# Patient Record
Sex: FEMALE | Employment: UNEMPLOYED | URBAN - METROPOLITAN AREA
[De-identification: names, ages, dates, MRNs, and addresses within clinical notes are randomized per-mention and may not be internally consistent; named-entity substitution may affect disease eponyms.]

---

## 2017-05-04 ENCOUNTER — HOSPITAL ENCOUNTER (OUTPATIENT)
Dept: ULTRASOUND IMAGING | Age: 52
Discharge: HOME | End: 2017-05-04
Attending: FAMILY MEDICINE | Admitting: FAMILY MEDICINE

## 2017-05-04 DIAGNOSIS — Z30.431 IUD CHECK UP: ICD-10-CM

## 2019-07-22 ENCOUNTER — HOSPITAL ENCOUNTER (EMERGENCY)
Age: 54
Discharge: HOME | End: 2019-07-22

## 2019-07-22 ENCOUNTER — APPOINTMENT (OUTPATIENT)
Dept: GENERAL RADIOLOGY | Age: 54
End: 2019-07-22

## 2019-07-22 VITALS
WEIGHT: 160 LBS | TEMPERATURE: 99.1 F | DIASTOLIC BLOOD PRESSURE: 76 MMHG | SYSTOLIC BLOOD PRESSURE: 153 MMHG | HEART RATE: 76 BPM | BODY MASS INDEX: 32.32 KG/M2 | OXYGEN SATURATION: 99 % | RESPIRATION RATE: 16 BRPM

## 2019-07-22 DIAGNOSIS — S93.492A SPRAIN OF ANTERIOR TALOFIBULAR LIGAMENT OF LEFT ANKLE, INITIAL ENCOUNTER: Primary | ICD-10-CM

## 2019-07-22 DIAGNOSIS — M19.072 PRIMARY OSTEOARTHRITIS OF LEFT ANKLE: ICD-10-CM

## 2019-07-22 PROCEDURE — 6370000000 HC RX 637 (ALT 250 FOR IP): Performed by: PHYSICIAN ASSISTANT

## 2019-07-22 PROCEDURE — 73610 X-RAY EXAM OF ANKLE: CPT

## 2019-07-22 PROCEDURE — 99283 EMERGENCY DEPT VISIT LOW MDM: CPT

## 2019-07-22 RX ORDER — NAPROXEN 250 MG/1
500 TABLET ORAL ONCE
Status: COMPLETED | OUTPATIENT
Start: 2019-07-22 | End: 2019-07-22

## 2019-07-22 RX ORDER — NAPROXEN 500 MG/1
500 TABLET ORAL 2 TIMES DAILY PRN
Qty: 20 TABLET | Refills: 0 | Status: ON HOLD | OUTPATIENT
Start: 2019-07-22 | End: 2019-10-28 | Stop reason: HOSPADM

## 2019-07-22 RX ORDER — NAPROXEN 250 MG/1
500 TABLET ORAL 2 TIMES DAILY WITH MEALS
COMMUNITY
End: 2019-07-22

## 2019-07-22 RX ADMIN — NAPROXEN 500 MG: 250 TABLET ORAL at 15:46

## 2019-07-22 ASSESSMENT — ENCOUNTER SYMPTOMS
CHEST TIGHTNESS: 0
VOMITING: 0
COLOR CHANGE: 0
ABDOMINAL PAIN: 0
DIARRHEA: 0
NAUSEA: 0
SHORTNESS OF BREATH: 0

## 2019-07-22 ASSESSMENT — PAIN DESCRIPTION - PAIN TYPE: TYPE: ACUTE PAIN

## 2019-07-22 ASSESSMENT — PAIN SCALES - GENERAL
PAINLEVEL_OUTOF10: 8
PAINLEVEL_OUTOF10: 8

## 2019-07-22 ASSESSMENT — PAIN DESCRIPTION - ORIENTATION: ORIENTATION: LEFT

## 2019-07-22 ASSESSMENT — PAIN DESCRIPTION - LOCATION: LOCATION: ANKLE

## 2019-07-22 NOTE — ED PROVIDER NOTES
worsen    Misael Barrera, [address] [facility]  [place] [address]  [phone]    Schedule an appointment as soon as possible for a visit   If symptoms worsen      DISCHARGE MEDICATIONS:  New Prescriptions    NAPROXEN (NAPROSYN) 500 MG TABLET    Take 1 tablet by mouth 2 times daily as needed for Pain       DISCONTINUED MEDICATIONS:  Discontinued Medications    NAPROXEN (NAPROSYN) 250 MG TABLET    Take 500 mg by mouth 2 times daily (with meals)            (Please note the MDM and HPI sections of this note were completed with a voice recognition program.  Efforts weremade to edit the dictations but occasionally words are mis-transcribed.)    Electronically signed, Shayy Driver PA-C,          Srinivas Sumner PA-C  07/22/19 4747

## 2019-08-06 ENCOUNTER — OFFICE VISIT (OUTPATIENT)
Dept: ORTHOPEDIC SURGERY | Age: 54
End: 2019-08-06

## 2019-08-06 VITALS
HEIGHT: 59 IN | SYSTOLIC BLOOD PRESSURE: 125 MMHG | RESPIRATION RATE: 16 BRPM | BODY MASS INDEX: 32.25 KG/M2 | WEIGHT: 160 LBS | DIASTOLIC BLOOD PRESSURE: 78 MMHG | HEART RATE: 106 BPM

## 2019-08-06 DIAGNOSIS — M65.9 SYNOVITIS OF LEFT ANKLE: Primary | ICD-10-CM

## 2019-08-06 PROBLEM — M65.972 SYNOVITIS OF LEFT ANKLE: Status: ACTIVE | Noted: 2019-08-06

## 2019-08-06 PROCEDURE — 99203 OFFICE O/P NEW LOW 30 MIN: CPT | Performed by: ORTHOPAEDIC SURGERY

## 2019-08-06 RX ORDER — INDOMETHACIN 50 MG/1
50 CAPSULE ORAL 3 TIMES DAILY
Qty: 42 CAPSULE | Refills: 0 | Status: ON HOLD | OUTPATIENT
Start: 2019-08-06 | End: 2019-10-28 | Stop reason: HOSPADM

## 2019-08-06 RX ORDER — METHYLPREDNISOLONE 4 MG/1
TABLET ORAL
Qty: 1 KIT | Refills: 0 | Status: SHIPPED | OUTPATIENT
Start: 2019-08-06 | End: 2019-08-22 | Stop reason: ALTCHOICE

## 2019-08-22 ENCOUNTER — OFFICE VISIT (OUTPATIENT)
Dept: ORTHOPEDIC SURGERY | Age: 54
End: 2019-08-22

## 2019-08-22 VITALS
HEART RATE: 110 BPM | BODY MASS INDEX: 32.27 KG/M2 | DIASTOLIC BLOOD PRESSURE: 86 MMHG | SYSTOLIC BLOOD PRESSURE: 146 MMHG | HEIGHT: 59 IN | WEIGHT: 160.05 LBS | RESPIRATION RATE: 17 BRPM

## 2019-08-22 DIAGNOSIS — M65.9 SYNOVITIS OF LEFT ANKLE: Primary | ICD-10-CM

## 2019-08-22 PROCEDURE — 20605 DRAIN/INJ JOINT/BURSA W/O US: CPT | Performed by: ORTHOPAEDIC SURGERY

## 2019-08-22 PROCEDURE — 99214 OFFICE O/P EST MOD 30 MIN: CPT | Performed by: ORTHOPAEDIC SURGERY

## 2019-10-03 ENCOUNTER — OFFICE VISIT (OUTPATIENT)
Dept: ORTHOPEDIC SURGERY | Age: 54
End: 2019-10-03

## 2019-10-03 VITALS
HEIGHT: 59 IN | HEART RATE: 117 BPM | BODY MASS INDEX: 32.25 KG/M2 | SYSTOLIC BLOOD PRESSURE: 134 MMHG | DIASTOLIC BLOOD PRESSURE: 80 MMHG | WEIGHT: 160 LBS

## 2019-10-03 DIAGNOSIS — M65.9 SYNOVITIS OF LEFT ANKLE: Primary | ICD-10-CM

## 2019-10-03 PROCEDURE — 99214 OFFICE O/P EST MOD 30 MIN: CPT | Performed by: ORTHOPAEDIC SURGERY

## 2019-10-03 RX ORDER — MELOXICAM 7.5 MG/1
7.5 TABLET ORAL DAILY
Qty: 30 TABLET | Refills: 0 | Status: ON HOLD | OUTPATIENT
Start: 2019-10-03 | End: 2019-10-28 | Stop reason: HOSPADM

## 2019-10-14 ENCOUNTER — HOSPITAL ENCOUNTER (OUTPATIENT)
Dept: WOMENS IMAGING | Age: 54
Discharge: HOME | End: 2019-10-14
Payer: COMMERCIAL

## 2019-10-14 DIAGNOSIS — Z12.31 BREAST CANCER SCREENING BY MAMMOGRAM: ICD-10-CM

## 2019-10-14 PROCEDURE — 77067 SCR MAMMO BI INCL CAD: CPT

## 2019-10-18 ENCOUNTER — HOSPITAL ENCOUNTER (OUTPATIENT)
Dept: MRI IMAGING | Age: 54
Discharge: HOME | End: 2019-10-18

## 2019-10-18 DIAGNOSIS — M65.9 SYNOVITIS OF LEFT ANKLE: ICD-10-CM

## 2019-10-18 PROCEDURE — 73721 MRI JNT OF LWR EXTRE W/O DYE: CPT

## 2019-10-22 ENCOUNTER — HOSPITAL ENCOUNTER (INPATIENT)
Age: 54
LOS: 5 days | Discharge: HOME | DRG: 313 | End: 2019-10-28
Attending: EMERGENCY MEDICINE | Admitting: HOSPITALIST
Payer: MEDICAID

## 2019-10-22 DIAGNOSIS — L03.119 CELLULITIS AND ABSCESS OF LEG: Primary | ICD-10-CM

## 2019-10-22 DIAGNOSIS — M25.572 ACUTE LEFT ANKLE PAIN: ICD-10-CM

## 2019-10-22 DIAGNOSIS — L02.419 CELLULITIS AND ABSCESS OF LEG: Primary | ICD-10-CM

## 2019-10-22 PROCEDURE — 99284 EMERGENCY DEPT VISIT MOD MDM: CPT

## 2019-10-22 ASSESSMENT — PAIN DESCRIPTION - PAIN TYPE: TYPE: ACUTE PAIN

## 2019-10-22 ASSESSMENT — PAIN DESCRIPTION - LOCATION: LOCATION: FOOT

## 2019-10-22 ASSESSMENT — PAIN DESCRIPTION - ORIENTATION: ORIENTATION: LEFT

## 2019-10-22 ASSESSMENT — PAIN SCALES - GENERAL: PAINLEVEL_OUTOF10: 9

## 2019-10-23 PROBLEM — L03.116 CELLULITIS OF LEFT ANKLE: Status: ACTIVE | Noted: 2019-10-23

## 2019-10-23 LAB
A/G RATIO: 0.9 (ref 1.1–2.2)
ALBUMIN SERPL-MCNC: 3.9 G/DL (ref 3.4–5)
ALP BLD-CCNC: 87 U/L (ref 40–129)
ALT SERPL-CCNC: 11 U/L (ref 10–40)
ANION GAP SERPL CALCULATED.3IONS-SCNC: 9 MMOL/L (ref 3–16)
AST SERPL-CCNC: 12 U/L (ref 15–37)
BASOPHILS ABSOLUTE: 0 K/UL (ref 0–0.2)
BASOPHILS RELATIVE PERCENT: 0.6 %
BILIRUB SERPL-MCNC: <0.2 MG/DL (ref 0–1)
BUN BLDV-MCNC: 23 MG/DL (ref 7–20)
C-REACTIVE PROTEIN: 6 MG/L (ref 0–5.1)
CALCIUM SERPL-MCNC: 9.3 MG/DL (ref 8.3–10.6)
CHLORIDE BLD-SCNC: 105 MMOL/L (ref 99–110)
CO2: 24 MMOL/L (ref 21–32)
CREAT SERPL-MCNC: 0.9 MG/DL (ref 0.6–1.1)
EOSINOPHILS ABSOLUTE: 0.2 K/UL (ref 0–0.6)
EOSINOPHILS RELATIVE PERCENT: 2.5 %
GFR AFRICAN AMERICAN: >60
GFR NON-AFRICAN AMERICAN: >60
GLOBULIN: 4.5 G/DL
GLUCOSE BLD-MCNC: 117 MG/DL (ref 70–99)
HCT VFR BLD CALC: 36.8 % (ref 36–48)
HEMOGLOBIN: 12.3 G/DL (ref 12–16)
LACTIC ACID, SEPSIS: 0.8 MMOL/L (ref 0.4–1.9)
LYMPHOCYTES ABSOLUTE: 2.5 K/UL (ref 1–5.1)
LYMPHOCYTES RELATIVE PERCENT: 35.3 %
MCH RBC QN AUTO: 29 PG (ref 26–34)
MCHC RBC AUTO-ENTMCNC: 33.5 G/DL (ref 31–36)
MCV RBC AUTO: 86.5 FL (ref 80–100)
MONOCYTES ABSOLUTE: 0.6 K/UL (ref 0–1.3)
MONOCYTES RELATIVE PERCENT: 7.8 %
NEUTROPHILS ABSOLUTE: 3.8 K/UL (ref 1.7–7.7)
NEUTROPHILS RELATIVE PERCENT: 53.8 %
PDW BLD-RTO: 14.1 % (ref 12.4–15.4)
PLATELET # BLD: 316 K/UL (ref 135–450)
PMV BLD AUTO: 9.1 FL (ref 5–10.5)
POTASSIUM REFLEX MAGNESIUM: 3.6 MMOL/L (ref 3.5–5.1)
RBC # BLD: 4.26 M/UL (ref 4–5.2)
SEDIMENTATION RATE, ERYTHROCYTE: 87 MM/HR (ref 0–30)
SODIUM BLD-SCNC: 138 MMOL/L (ref 136–145)
TOTAL PROTEIN: 8.4 G/DL (ref 6.4–8.2)
WBC # BLD: 7.1 K/UL (ref 4–11)

## 2019-10-23 PROCEDURE — 87070 CULTURE OTHR SPECIMN AEROBIC: CPT

## 2019-10-23 PROCEDURE — 87077 CULTURE AEROBIC IDENTIFY: CPT

## 2019-10-23 PROCEDURE — 2580000003 HC RX 258: Performed by: EMERGENCY MEDICINE

## 2019-10-23 PROCEDURE — 87040 BLOOD CULTURE FOR BACTERIA: CPT

## 2019-10-23 PROCEDURE — 6370000000 HC RX 637 (ALT 250 FOR IP): Performed by: PHYSICIAN ASSISTANT

## 2019-10-23 PROCEDURE — 96365 THER/PROPH/DIAG IV INF INIT: CPT

## 2019-10-23 PROCEDURE — 85652 RBC SED RATE AUTOMATED: CPT

## 2019-10-23 PROCEDURE — 6360000002 HC RX W HCPCS: Performed by: HOSPITALIST

## 2019-10-23 PROCEDURE — 87186 SC STD MICRODIL/AGAR DIL: CPT

## 2019-10-23 PROCEDURE — 2580000003 HC RX 258: Performed by: HOSPITALIST

## 2019-10-23 PROCEDURE — 80053 COMPREHEN METABOLIC PANEL: CPT

## 2019-10-23 PROCEDURE — 87205 SMEAR GRAM STAIN: CPT

## 2019-10-23 PROCEDURE — 86140 C-REACTIVE PROTEIN: CPT

## 2019-10-23 PROCEDURE — 6360000002 HC RX W HCPCS: Performed by: INTERNAL MEDICINE

## 2019-10-23 PROCEDURE — 1200000000 HC SEMI PRIVATE

## 2019-10-23 PROCEDURE — 99255 IP/OBS CONSLTJ NEW/EST HI 80: CPT | Performed by: INTERNAL MEDICINE

## 2019-10-23 PROCEDURE — 6360000002 HC RX W HCPCS: Performed by: EMERGENCY MEDICINE

## 2019-10-23 PROCEDURE — 85025 COMPLETE CBC W/AUTO DIFF WBC: CPT

## 2019-10-23 PROCEDURE — 83605 ASSAY OF LACTIC ACID: CPT

## 2019-10-23 PROCEDURE — 36415 COLL VENOUS BLD VENIPUNCTURE: CPT

## 2019-10-23 PROCEDURE — 96367 TX/PROPH/DG ADDL SEQ IV INF: CPT

## 2019-10-23 PROCEDURE — 94760 N-INVAS EAR/PLS OXIMETRY 1: CPT

## 2019-10-23 PROCEDURE — 6370000000 HC RX 637 (ALT 250 FOR IP): Performed by: HOSPITALIST

## 2019-10-23 RX ORDER — VANCOMYCIN HYDROCHLORIDE 1 G/200ML
1000 INJECTION, SOLUTION INTRAVENOUS ONCE
Status: COMPLETED | OUTPATIENT
Start: 2019-10-23 | End: 2019-10-23

## 2019-10-23 RX ORDER — SODIUM CHLORIDE 0.9 % (FLUSH) 0.9 %
10 SYRINGE (ML) INJECTION EVERY 12 HOURS SCHEDULED
Status: DISCONTINUED | OUTPATIENT
Start: 2019-10-23 | End: 2019-10-24 | Stop reason: SDUPTHER

## 2019-10-23 RX ORDER — VANCOMYCIN HYDROCHLORIDE 1 G/200ML
1000 INJECTION, SOLUTION INTRAVENOUS EVERY 12 HOURS
Status: DISCONTINUED | OUTPATIENT
Start: 2019-10-23 | End: 2019-10-24

## 2019-10-23 RX ORDER — POTASSIUM CHLORIDE 7.45 MG/ML
10 INJECTION INTRAVENOUS PRN
Status: DISCONTINUED | OUTPATIENT
Start: 2019-10-23 | End: 2019-10-28 | Stop reason: HOSPADM

## 2019-10-23 RX ORDER — SODIUM CHLORIDE 9 MG/ML
INJECTION, SOLUTION INTRAVENOUS CONTINUOUS
Status: DISCONTINUED | OUTPATIENT
Start: 2019-10-23 | End: 2019-10-26

## 2019-10-23 RX ORDER — ONDANSETRON 2 MG/ML
4 INJECTION INTRAMUSCULAR; INTRAVENOUS EVERY 6 HOURS PRN
Status: DISCONTINUED | OUTPATIENT
Start: 2019-10-23 | End: 2019-10-24 | Stop reason: SDUPTHER

## 2019-10-23 RX ORDER — MAGNESIUM SULFATE 1 G/100ML
1 INJECTION INTRAVENOUS PRN
Status: DISCONTINUED | OUTPATIENT
Start: 2019-10-23 | End: 2019-10-28 | Stop reason: HOSPADM

## 2019-10-23 RX ORDER — POTASSIUM CHLORIDE 20 MEQ/1
40 TABLET, EXTENDED RELEASE ORAL PRN
Status: DISCONTINUED | OUTPATIENT
Start: 2019-10-23 | End: 2019-10-28 | Stop reason: HOSPADM

## 2019-10-23 RX ORDER — ACETAMINOPHEN 325 MG/1
650 TABLET ORAL EVERY 4 HOURS PRN
Status: DISCONTINUED | OUTPATIENT
Start: 2019-10-23 | End: 2019-10-28 | Stop reason: HOSPADM

## 2019-10-23 RX ORDER — VANCOMYCIN HYDROCHLORIDE 1 G/200ML
1000 INJECTION, SOLUTION INTRAVENOUS
Status: DISCONTINUED | OUTPATIENT
Start: 2019-10-23 | End: 2019-10-23

## 2019-10-23 RX ORDER — SODIUM CHLORIDE 0.9 % (FLUSH) 0.9 %
10 SYRINGE (ML) INJECTION PRN
Status: DISCONTINUED | OUTPATIENT
Start: 2019-10-23 | End: 2019-10-24 | Stop reason: SDUPTHER

## 2019-10-23 RX ORDER — FAMOTIDINE 20 MG/1
20 TABLET, FILM COATED ORAL 2 TIMES DAILY
Status: DISCONTINUED | OUTPATIENT
Start: 2019-10-23 | End: 2019-10-28 | Stop reason: HOSPADM

## 2019-10-23 RX ORDER — CLINDAMYCIN PHOSPHATE 600 MG/50ML
600 INJECTION INTRAVENOUS ONCE
Status: DISCONTINUED | OUTPATIENT
Start: 2019-10-23 | End: 2019-10-23

## 2019-10-23 RX ORDER — VANCOMYCIN HYDROCHLORIDE 1 G/200ML
1000 INJECTION, SOLUTION INTRAVENOUS EVERY 12 HOURS
Status: DISCONTINUED | OUTPATIENT
Start: 2019-10-23 | End: 2019-10-23 | Stop reason: DRUGHIGH

## 2019-10-23 RX ORDER — OXYCODONE HYDROCHLORIDE AND ACETAMINOPHEN 5; 325 MG/1; MG/1
1 TABLET ORAL ONCE
Status: COMPLETED | OUTPATIENT
Start: 2019-10-23 | End: 2019-10-23

## 2019-10-23 RX ADMIN — Medication 10 ML: at 21:50

## 2019-10-23 RX ADMIN — OXYCODONE HYDROCHLORIDE AND ACETAMINOPHEN 1 TABLET: 5; 325 TABLET ORAL at 01:58

## 2019-10-23 RX ADMIN — CEFEPIME HYDROCHLORIDE 2 G: 2 INJECTION, POWDER, FOR SOLUTION INTRAVENOUS at 13:05

## 2019-10-23 RX ADMIN — VANCOMYCIN HYDROCHLORIDE 1000 MG: 1 INJECTION, SOLUTION INTRAVENOUS at 15:14

## 2019-10-23 RX ADMIN — SODIUM CHLORIDE: 9 INJECTION, SOLUTION INTRAVENOUS at 22:06

## 2019-10-23 RX ADMIN — VANCOMYCIN HYDROCHLORIDE 1000 MG: 1 INJECTION, SOLUTION INTRAVENOUS at 01:38

## 2019-10-23 RX ADMIN — ACETAMINOPHEN 650 MG: 325 TABLET, FILM COATED ORAL at 19:36

## 2019-10-23 RX ADMIN — FAMOTIDINE 20 MG: 20 TABLET ORAL at 21:49

## 2019-10-23 RX ADMIN — SODIUM CHLORIDE: 9 INJECTION, SOLUTION INTRAVENOUS at 04:33

## 2019-10-23 RX ADMIN — ACETAMINOPHEN 650 MG: 325 TABLET, FILM COATED ORAL at 10:00

## 2019-10-23 RX ADMIN — CEFTRIAXONE 2 G: 2 INJECTION, POWDER, FOR SOLUTION INTRAMUSCULAR; INTRAVENOUS at 01:07

## 2019-10-23 ASSESSMENT — PAIN DESCRIPTION - LOCATION
LOCATION: ANKLE

## 2019-10-23 ASSESSMENT — PAIN SCALES - GENERAL
PAINLEVEL_OUTOF10: 9
PAINLEVEL_OUTOF10: 7
PAINLEVEL_OUTOF10: 7
PAINLEVEL_OUTOF10: 3
PAINLEVEL_OUTOF10: 8
PAINLEVEL_OUTOF10: 5

## 2019-10-23 ASSESSMENT — ENCOUNTER SYMPTOMS
RHINORRHEA: 0
NAUSEA: 0
APNEA: 0
PHOTOPHOBIA: 0
EYE REDNESS: 0
EYE DISCHARGE: 0
DIARRHEA: 0
BLOOD IN STOOL: 0
COUGH: 0
SHORTNESS OF BREATH: 0
STRIDOR: 0
VOMITING: 0
CHEST TIGHTNESS: 0
FACIAL SWELLING: 0
TROUBLE SWALLOWING: 0
COLOR CHANGE: 0
CHOKING: 0
WHEEZING: 0
ABDOMINAL PAIN: 0

## 2019-10-23 ASSESSMENT — PAIN DESCRIPTION - ORIENTATION
ORIENTATION: LEFT

## 2019-10-23 ASSESSMENT — PAIN DESCRIPTION - PAIN TYPE
TYPE: ACUTE PAIN

## 2019-10-24 ENCOUNTER — ANESTHESIA EVENT (OUTPATIENT)
Dept: OPERATING ROOM | Age: 54
DRG: 313 | End: 2019-10-24
Payer: MEDICAID

## 2019-10-24 ENCOUNTER — ANESTHESIA (OUTPATIENT)
Dept: OPERATING ROOM | Age: 54
DRG: 313 | End: 2019-10-24
Payer: MEDICAID

## 2019-10-24 VITALS
DIASTOLIC BLOOD PRESSURE: 66 MMHG | SYSTOLIC BLOOD PRESSURE: 126 MMHG | OXYGEN SATURATION: 100 % | RESPIRATION RATE: 2 BRPM

## 2019-10-24 LAB
A/G RATIO: 0.9 (ref 1.1–2.2)
ALBUMIN SERPL-MCNC: 3.6 G/DL (ref 3.4–5)
ALP BLD-CCNC: 76 U/L (ref 40–129)
ALT SERPL-CCNC: 9 U/L (ref 10–40)
ANION GAP SERPL CALCULATED.3IONS-SCNC: 10 MMOL/L (ref 3–16)
AST SERPL-CCNC: 14 U/L (ref 15–37)
BASOPHILS ABSOLUTE: 0 K/UL (ref 0–0.2)
BASOPHILS RELATIVE PERCENT: 0.4 %
BILIRUB SERPL-MCNC: 0.5 MG/DL (ref 0–1)
BUN BLDV-MCNC: 11 MG/DL (ref 7–20)
CALCIUM SERPL-MCNC: 9.5 MG/DL (ref 8.3–10.6)
CHLORIDE BLD-SCNC: 108 MMOL/L (ref 99–110)
CO2: 26 MMOL/L (ref 21–32)
CREAT SERPL-MCNC: <0.5 MG/DL (ref 0.6–1.1)
EOSINOPHILS ABSOLUTE: 0.1 K/UL (ref 0–0.6)
EOSINOPHILS RELATIVE PERCENT: 2.9 %
ESTIMATED AVERAGE GLUCOSE: 108.3 MG/DL
GFR AFRICAN AMERICAN: >60
GFR NON-AFRICAN AMERICAN: >60
GLOBULIN: 4 G/DL
GLUCOSE BLD-MCNC: 118 MG/DL (ref 70–99)
HBA1C MFR BLD: 5.4 %
HCT VFR BLD CALC: 35.9 % (ref 36–48)
HEMOGLOBIN: 11.9 G/DL (ref 12–16)
LYMPHOCYTES ABSOLUTE: 1.3 K/UL (ref 1–5.1)
LYMPHOCYTES RELATIVE PERCENT: 27.7 %
MAGNESIUM: 2.2 MG/DL (ref 1.8–2.4)
MCH RBC QN AUTO: 28.7 PG (ref 26–34)
MCHC RBC AUTO-ENTMCNC: 33.2 G/DL (ref 31–36)
MCV RBC AUTO: 86.5 FL (ref 80–100)
MONOCYTES ABSOLUTE: 0.4 K/UL (ref 0–1.3)
MONOCYTES RELATIVE PERCENT: 7.6 %
NEUTROPHILS ABSOLUTE: 2.9 K/UL (ref 1.7–7.7)
NEUTROPHILS RELATIVE PERCENT: 61.4 %
PDW BLD-RTO: 14.6 % (ref 12.4–15.4)
PLATELET # BLD: 262 K/UL (ref 135–450)
PMV BLD AUTO: 8.8 FL (ref 5–10.5)
POTASSIUM SERPL-SCNC: 4.6 MMOL/L (ref 3.5–5.1)
RBC # BLD: 4.15 M/UL (ref 4–5.2)
SODIUM BLD-SCNC: 144 MMOL/L (ref 136–145)
TOTAL PROTEIN: 7.6 G/DL (ref 6.4–8.2)
VANCOMYCIN TROUGH: 11.3 UG/ML (ref 10–20)
WBC # BLD: 4.8 K/UL (ref 4–11)

## 2019-10-24 PROCEDURE — 3700000000 HC ANESTHESIA ATTENDED CARE: Performed by: ORTHOPAEDIC SURGERY

## 2019-10-24 PROCEDURE — 7100000001 HC PACU RECOVERY - ADDTL 15 MIN: Performed by: ORTHOPAEDIC SURGERY

## 2019-10-24 PROCEDURE — 2580000003 HC RX 258: Performed by: ORTHOPAEDIC SURGERY

## 2019-10-24 PROCEDURE — 6360000002 HC RX W HCPCS: Performed by: INTERNAL MEDICINE

## 2019-10-24 PROCEDURE — 6360000002 HC RX W HCPCS: Performed by: NURSE ANESTHETIST, CERTIFIED REGISTERED

## 2019-10-24 PROCEDURE — 83036 HEMOGLOBIN GLYCOSYLATED A1C: CPT

## 2019-10-24 PROCEDURE — 6360000002 HC RX W HCPCS: Performed by: ANESTHESIOLOGY

## 2019-10-24 PROCEDURE — 27610 EXPLORE/TREAT ANKLE JOINT: CPT | Performed by: ORTHOPAEDIC SURGERY

## 2019-10-24 PROCEDURE — 3700000001 HC ADD 15 MINUTES (ANESTHESIA): Performed by: ORTHOPAEDIC SURGERY

## 2019-10-24 PROCEDURE — 2500000003 HC RX 250 WO HCPCS: Performed by: NURSE ANESTHETIST, CERTIFIED REGISTERED

## 2019-10-24 PROCEDURE — 2580000003 HC RX 258: Performed by: HOSPITALIST

## 2019-10-24 PROCEDURE — 6370000000 HC RX 637 (ALT 250 FOR IP): Performed by: ORTHOPAEDIC SURGERY

## 2019-10-24 PROCEDURE — 80202 ASSAY OF VANCOMYCIN: CPT

## 2019-10-24 PROCEDURE — 6360000002 HC RX W HCPCS: Performed by: HOSPITALIST

## 2019-10-24 PROCEDURE — 99233 SBSQ HOSP IP/OBS HIGH 50: CPT | Performed by: INTERNAL MEDICINE

## 2019-10-24 PROCEDURE — 83735 ASSAY OF MAGNESIUM: CPT

## 2019-10-24 PROCEDURE — 6360000002 HC RX W HCPCS: Performed by: ORTHOPAEDIC SURGERY

## 2019-10-24 PROCEDURE — 3600000002 HC SURGERY LEVEL 2 BASE: Performed by: ORTHOPAEDIC SURGERY

## 2019-10-24 PROCEDURE — 2709999900 HC NON-CHARGEABLE SUPPLY: Performed by: ORTHOPAEDIC SURGERY

## 2019-10-24 PROCEDURE — 36415 COLL VENOUS BLD VENIPUNCTURE: CPT

## 2019-10-24 PROCEDURE — 85025 COMPLETE CBC W/AUTO DIFF WBC: CPT

## 2019-10-24 PROCEDURE — 1200000000 HC SEMI PRIVATE

## 2019-10-24 PROCEDURE — 88305 TISSUE EXAM BY PATHOLOGIST: CPT

## 2019-10-24 PROCEDURE — 87070 CULTURE OTHR SPECIMN AEROBIC: CPT

## 2019-10-24 PROCEDURE — 6360000002 HC RX W HCPCS: Performed by: PHYSICIAN ASSISTANT

## 2019-10-24 PROCEDURE — 0S9G00Z DRAINAGE OF LEFT ANKLE JOINT WITH DRAINAGE DEVICE, OPEN APPROACH: ICD-10-PCS | Performed by: ORTHOPAEDIC SURGERY

## 2019-10-24 PROCEDURE — 87205 SMEAR GRAM STAIN: CPT

## 2019-10-24 PROCEDURE — 3600000012 HC SURGERY LEVEL 2 ADDTL 15MIN: Performed by: ORTHOPAEDIC SURGERY

## 2019-10-24 PROCEDURE — 99024 POSTOP FOLLOW-UP VISIT: CPT | Performed by: NURSE PRACTITIONER

## 2019-10-24 PROCEDURE — 87075 CULTR BACTERIA EXCEPT BLOOD: CPT

## 2019-10-24 PROCEDURE — APPNB30 APP NON BILLABLE TIME 0-30 MINS: Performed by: NURSE PRACTITIONER

## 2019-10-24 PROCEDURE — 7100000000 HC PACU RECOVERY - FIRST 15 MIN: Performed by: ORTHOPAEDIC SURGERY

## 2019-10-24 PROCEDURE — 6370000000 HC RX 637 (ALT 250 FOR IP): Performed by: PHYSICIAN ASSISTANT

## 2019-10-24 PROCEDURE — 80053 COMPREHEN METABOLIC PANEL: CPT

## 2019-10-24 PROCEDURE — 0QBM0ZX EXCISION OF LEFT TARSAL, OPEN APPROACH, DIAGNOSTIC: ICD-10-PCS | Performed by: ORTHOPAEDIC SURGERY

## 2019-10-24 RX ORDER — ONDANSETRON 2 MG/ML
4 INJECTION INTRAMUSCULAR; INTRAVENOUS
Status: DISCONTINUED | OUTPATIENT
Start: 2019-10-24 | End: 2019-10-24

## 2019-10-24 RX ORDER — OXYCODONE HYDROCHLORIDE AND ACETAMINOPHEN 5; 325 MG/1; MG/1
1 TABLET ORAL
Status: DISCONTINUED | OUTPATIENT
Start: 2019-10-24 | End: 2019-10-24

## 2019-10-24 RX ORDER — HYDROMORPHONE HCL 110MG/55ML
0.5 PATIENT CONTROLLED ANALGESIA SYRINGE INTRAVENOUS EVERY 5 MIN PRN
Status: COMPLETED | OUTPATIENT
Start: 2019-10-24 | End: 2019-10-24

## 2019-10-24 RX ORDER — SENNA AND DOCUSATE SODIUM 50; 8.6 MG/1; MG/1
1 TABLET, FILM COATED ORAL 2 TIMES DAILY
Status: DISCONTINUED | OUTPATIENT
Start: 2019-10-24 | End: 2019-10-28 | Stop reason: HOSPADM

## 2019-10-24 RX ORDER — MEPERIDINE HYDROCHLORIDE 25 MG/ML
12.5 INJECTION INTRAMUSCULAR; INTRAVENOUS; SUBCUTANEOUS EVERY 5 MIN PRN
Status: DISCONTINUED | OUTPATIENT
Start: 2019-10-24 | End: 2019-10-24

## 2019-10-24 RX ORDER — FENTANYL CITRATE 50 UG/ML
INJECTION, SOLUTION INTRAMUSCULAR; INTRAVENOUS PRN
Status: DISCONTINUED | OUTPATIENT
Start: 2019-10-24 | End: 2019-10-24 | Stop reason: SDUPTHER

## 2019-10-24 RX ORDER — DOCUSATE SODIUM 100 MG/1
100 CAPSULE, LIQUID FILLED ORAL 2 TIMES DAILY
Status: DISCONTINUED | OUTPATIENT
Start: 2019-10-24 | End: 2019-10-28 | Stop reason: HOSPADM

## 2019-10-24 RX ORDER — ONDANSETRON 2 MG/ML
4 INJECTION INTRAMUSCULAR; INTRAVENOUS EVERY 6 HOURS PRN
Status: DISCONTINUED | OUTPATIENT
Start: 2019-10-24 | End: 2019-10-28 | Stop reason: HOSPADM

## 2019-10-24 RX ORDER — MORPHINE SULFATE 2 MG/ML
2 INJECTION, SOLUTION INTRAMUSCULAR; INTRAVENOUS
Status: DISCONTINUED | OUTPATIENT
Start: 2019-10-24 | End: 2019-10-28 | Stop reason: HOSPADM

## 2019-10-24 RX ORDER — SODIUM CHLORIDE 450 MG/100ML
INJECTION, SOLUTION INTRAVENOUS CONTINUOUS
Status: DISCONTINUED | OUTPATIENT
Start: 2019-10-24 | End: 2019-10-26

## 2019-10-24 RX ORDER — DEXAMETHASONE SODIUM PHOSPHATE 4 MG/ML
INJECTION, SOLUTION INTRA-ARTICULAR; INTRALESIONAL; INTRAMUSCULAR; INTRAVENOUS; SOFT TISSUE PRN
Status: DISCONTINUED | OUTPATIENT
Start: 2019-10-24 | End: 2019-10-24 | Stop reason: SDUPTHER

## 2019-10-24 RX ORDER — LIDOCAINE HYDROCHLORIDE 20 MG/ML
INJECTION, SOLUTION EPIDURAL; INFILTRATION; INTRACAUDAL; PERINEURAL PRN
Status: DISCONTINUED | OUTPATIENT
Start: 2019-10-24 | End: 2019-10-24 | Stop reason: SDUPTHER

## 2019-10-24 RX ORDER — HYDROCODONE BITARTRATE AND ACETAMINOPHEN 5; 325 MG/1; MG/1
1 TABLET ORAL EVERY 4 HOURS PRN
Status: DISCONTINUED | OUTPATIENT
Start: 2019-10-24 | End: 2019-10-28 | Stop reason: HOSPADM

## 2019-10-24 RX ORDER — PROPOFOL 10 MG/ML
INJECTION, EMULSION INTRAVENOUS PRN
Status: DISCONTINUED | OUTPATIENT
Start: 2019-10-24 | End: 2019-10-24 | Stop reason: SDUPTHER

## 2019-10-24 RX ORDER — MORPHINE SULFATE 4 MG/ML
4 INJECTION, SOLUTION INTRAMUSCULAR; INTRAVENOUS EVERY 4 HOURS PRN
Status: DISCONTINUED | OUTPATIENT
Start: 2019-10-24 | End: 2019-10-28 | Stop reason: HOSPADM

## 2019-10-24 RX ORDER — SODIUM CHLORIDE 0.9 % (FLUSH) 0.9 %
10 SYRINGE (ML) INJECTION EVERY 12 HOURS SCHEDULED
Status: DISCONTINUED | OUTPATIENT
Start: 2019-10-24 | End: 2019-10-28

## 2019-10-24 RX ORDER — SODIUM CHLORIDE 0.9 % (FLUSH) 0.9 %
10 SYRINGE (ML) INJECTION PRN
Status: DISCONTINUED | OUTPATIENT
Start: 2019-10-24 | End: 2019-10-28

## 2019-10-24 RX ORDER — HYDRALAZINE HYDROCHLORIDE 20 MG/ML
5 INJECTION INTRAMUSCULAR; INTRAVENOUS EVERY 10 MIN PRN
Status: DISCONTINUED | OUTPATIENT
Start: 2019-10-24 | End: 2019-10-24

## 2019-10-24 RX ORDER — LABETALOL HYDROCHLORIDE 5 MG/ML
5 INJECTION, SOLUTION INTRAVENOUS EVERY 10 MIN PRN
Status: DISCONTINUED | OUTPATIENT
Start: 2019-10-24 | End: 2019-10-24

## 2019-10-24 RX ORDER — ONDANSETRON 2 MG/ML
INJECTION INTRAMUSCULAR; INTRAVENOUS PRN
Status: DISCONTINUED | OUTPATIENT
Start: 2019-10-24 | End: 2019-10-24 | Stop reason: SDUPTHER

## 2019-10-24 RX ADMIN — FENTANYL CITRATE 25 MCG: 50 INJECTION, SOLUTION INTRAMUSCULAR; INTRAVENOUS at 10:32

## 2019-10-24 RX ADMIN — DOCUSATE SODIUM 100 MG: 100 CAPSULE, LIQUID FILLED ORAL at 22:12

## 2019-10-24 RX ADMIN — HYDROMORPHONE HYDROCHLORIDE 0.5 MG: 2 INJECTION INTRAMUSCULAR; INTRAVENOUS; SUBCUTANEOUS at 11:09

## 2019-10-24 RX ADMIN — HYDROMORPHONE HYDROCHLORIDE 0.5 MG: 2 INJECTION INTRAMUSCULAR; INTRAVENOUS; SUBCUTANEOUS at 10:53

## 2019-10-24 RX ADMIN — Medication 10 ML: at 22:13

## 2019-10-24 RX ADMIN — FENTANYL CITRATE 50 MCG: 50 INJECTION, SOLUTION INTRAMUSCULAR; INTRAVENOUS at 10:04

## 2019-10-24 RX ADMIN — VANCOMYCIN HYDROCHLORIDE 1000 MG: 1 INJECTION, SOLUTION INTRAVENOUS at 01:58

## 2019-10-24 RX ADMIN — MORPHINE SULFATE 2 MG: 2 INJECTION, SOLUTION INTRAMUSCULAR; INTRAVENOUS at 13:24

## 2019-10-24 RX ADMIN — HYDROCODONE BITARTRATE AND ACETAMINOPHEN 1 TABLET: 5; 325 TABLET ORAL at 22:12

## 2019-10-24 RX ADMIN — CEFEPIME HYDROCHLORIDE 2 G: 2 INJECTION, POWDER, FOR SOLUTION INTRAVENOUS at 01:22

## 2019-10-24 RX ADMIN — HYDROMORPHONE HYDROCHLORIDE 0.5 MG: 2 INJECTION INTRAMUSCULAR; INTRAVENOUS; SUBCUTANEOUS at 11:30

## 2019-10-24 RX ADMIN — ONDANSETRON 4 MG: 2 INJECTION INTRAMUSCULAR; INTRAVENOUS at 10:10

## 2019-10-24 RX ADMIN — PROPOFOL 160 MG: 10 INJECTION, EMULSION INTRAVENOUS at 10:06

## 2019-10-24 RX ADMIN — DEXAMETHASONE SODIUM PHOSPHATE 8 MG: 4 INJECTION, SOLUTION INTRAMUSCULAR; INTRAVENOUS at 10:10

## 2019-10-24 RX ADMIN — VANCOMYCIN HYDROCHLORIDE 1000 MG: 1 INJECTION, SOLUTION INTRAVENOUS at 14:04

## 2019-10-24 RX ADMIN — SODIUM CHLORIDE: 9 INJECTION, SOLUTION INTRAVENOUS at 09:19

## 2019-10-24 RX ADMIN — SENNOSIDES AND DOCUSATE SODIUM 1 TABLET: 8.6; 5 TABLET ORAL at 22:12

## 2019-10-24 RX ADMIN — LIDOCAINE HYDROCHLORIDE 80 MG: 20 INJECTION, SOLUTION EPIDURAL; INFILTRATION; INTRACAUDAL; PERINEURAL at 10:06

## 2019-10-24 RX ADMIN — SODIUM CHLORIDE: 9 INJECTION, SOLUTION INTRAVENOUS at 13:30

## 2019-10-24 RX ADMIN — SODIUM CHLORIDE: 4.5 INJECTION, SOLUTION INTRAVENOUS at 13:33

## 2019-10-24 RX ADMIN — CEFEPIME HYDROCHLORIDE 2 G: 2 INJECTION, POWDER, FOR SOLUTION INTRAVENOUS at 13:26

## 2019-10-24 RX ADMIN — FAMOTIDINE 20 MG: 20 TABLET ORAL at 22:12

## 2019-10-24 RX ADMIN — FENTANYL CITRATE 25 MCG: 50 INJECTION, SOLUTION INTRAMUSCULAR; INTRAVENOUS at 10:31

## 2019-10-24 RX ADMIN — HYDROMORPHONE HYDROCHLORIDE 0.5 MG: 2 INJECTION INTRAMUSCULAR; INTRAVENOUS; SUBCUTANEOUS at 10:59

## 2019-10-24 ASSESSMENT — ENCOUNTER SYMPTOMS
ABDOMINAL PAIN: 0
EYE REDNESS: 0
FACIAL SWELLING: 0
EYE DISCHARGE: 0
COLOR CHANGE: 0
CHOKING: 0
TROUBLE SWALLOWING: 0
APNEA: 0
STRIDOR: 0
NAUSEA: 0
DIARRHEA: 0
CHEST TIGHTNESS: 0
COUGH: 0
SHORTNESS OF BREATH: 0
PHOTOPHOBIA: 0
BLOOD IN STOOL: 0
RHINORRHEA: 0

## 2019-10-24 ASSESSMENT — PAIN SCALES - GENERAL
PAINLEVEL_OUTOF10: 5
PAINLEVEL_OUTOF10: 9
PAINLEVEL_OUTOF10: 7
PAINLEVEL_OUTOF10: 10
PAINLEVEL_OUTOF10: 6
PAINLEVEL_OUTOF10: 7
PAINLEVEL_OUTOF10: 6
PAINLEVEL_OUTOF10: 9

## 2019-10-24 ASSESSMENT — PULMONARY FUNCTION TESTS
PIF_VALUE: 1
PIF_VALUE: 17
PIF_VALUE: 0
PIF_VALUE: 16
PIF_VALUE: 17
PIF_VALUE: 19
PIF_VALUE: 17
PIF_VALUE: 17
PIF_VALUE: 4
PIF_VALUE: 17
PIF_VALUE: 17
PIF_VALUE: 2
PIF_VALUE: 17
PIF_VALUE: 1
PIF_VALUE: 17
PIF_VALUE: 16
PIF_VALUE: 1
PIF_VALUE: 17
PIF_VALUE: 2
PIF_VALUE: 16
PIF_VALUE: 3
PIF_VALUE: 17
PIF_VALUE: 16
PIF_VALUE: 5
PIF_VALUE: 17
PIF_VALUE: 17
PIF_VALUE: 19
PIF_VALUE: 18
PIF_VALUE: 18
PIF_VALUE: 3
PIF_VALUE: 17
PIF_VALUE: 18
PIF_VALUE: 17
PIF_VALUE: 17
PIF_VALUE: 0
PIF_VALUE: 17
PIF_VALUE: 16
PIF_VALUE: 17
PIF_VALUE: 16
PIF_VALUE: 17
PIF_VALUE: 16
PIF_VALUE: 1
PIF_VALUE: 2

## 2019-10-24 ASSESSMENT — PAIN DESCRIPTION - PAIN TYPE
TYPE: SURGICAL PAIN

## 2019-10-24 ASSESSMENT — LIFESTYLE VARIABLES: SMOKING_STATUS: 0

## 2019-10-25 LAB
C-REACTIVE PROTEIN: 4 MG/L (ref 0–5.1)
GRAM STAIN RESULT: ABNORMAL
HCT VFR BLD CALC: 34.2 % (ref 36–48)
HEMOGLOBIN: 11.4 G/DL (ref 12–16)
ORGANISM: ABNORMAL
SEDIMENTATION RATE, ERYTHROCYTE: 52 MM/HR (ref 0–30)
WOUND/ABSCESS: ABNORMAL

## 2019-10-25 PROCEDURE — 6370000000 HC RX 637 (ALT 250 FOR IP): Performed by: ORTHOPAEDIC SURGERY

## 2019-10-25 PROCEDURE — 97165 OT EVAL LOW COMPLEX 30 MIN: CPT

## 2019-10-25 PROCEDURE — 2580000003 HC RX 258: Performed by: ORTHOPAEDIC SURGERY

## 2019-10-25 PROCEDURE — 6360000002 HC RX W HCPCS: Performed by: ORTHOPAEDIC SURGERY

## 2019-10-25 PROCEDURE — 99024 POSTOP FOLLOW-UP VISIT: CPT | Performed by: NURSE PRACTITIONER

## 2019-10-25 PROCEDURE — 97161 PT EVAL LOW COMPLEX 20 MIN: CPT

## 2019-10-25 PROCEDURE — APPNB60 APP NON BILLABLE TIME 46-60 MINS: Performed by: NURSE PRACTITIONER

## 2019-10-25 PROCEDURE — 6370000000 HC RX 637 (ALT 250 FOR IP): Performed by: PHYSICIAN ASSISTANT

## 2019-10-25 PROCEDURE — 86140 C-REACTIVE PROTEIN: CPT

## 2019-10-25 PROCEDURE — 1200000000 HC SEMI PRIVATE

## 2019-10-25 PROCEDURE — 36415 COLL VENOUS BLD VENIPUNCTURE: CPT

## 2019-10-25 PROCEDURE — 97530 THERAPEUTIC ACTIVITIES: CPT

## 2019-10-25 PROCEDURE — 85652 RBC SED RATE AUTOMATED: CPT

## 2019-10-25 PROCEDURE — 85018 HEMOGLOBIN: CPT

## 2019-10-25 PROCEDURE — 2580000003 HC RX 258: Performed by: INTERNAL MEDICINE

## 2019-10-25 PROCEDURE — 99233 SBSQ HOSP IP/OBS HIGH 50: CPT | Performed by: INTERNAL MEDICINE

## 2019-10-25 PROCEDURE — 94760 N-INVAS EAR/PLS OXIMETRY 1: CPT

## 2019-10-25 PROCEDURE — 6360000002 HC RX W HCPCS: Performed by: INTERNAL MEDICINE

## 2019-10-25 PROCEDURE — 85014 HEMATOCRIT: CPT

## 2019-10-25 PROCEDURE — 97116 GAIT TRAINING THERAPY: CPT

## 2019-10-25 RX ORDER — IBUPROFEN 400 MG/1
400 TABLET ORAL EVERY 6 HOURS PRN
Status: DISCONTINUED | OUTPATIENT
Start: 2019-10-25 | End: 2019-10-28 | Stop reason: HOSPADM

## 2019-10-25 RX ORDER — SODIUM CHLORIDE 0.9 % (FLUSH) 0.9 %
10 SYRINGE (ML) INJECTION EVERY 12 HOURS SCHEDULED
Status: DISCONTINUED | OUTPATIENT
Start: 2019-10-25 | End: 2019-10-28

## 2019-10-25 RX ORDER — CEFAZOLIN SODIUM 2 G/100ML
2 INJECTION, SOLUTION INTRAVENOUS EVERY 8 HOURS
Status: DISCONTINUED | OUTPATIENT
Start: 2019-10-25 | End: 2019-10-28 | Stop reason: HOSPADM

## 2019-10-25 RX ORDER — SODIUM CHLORIDE 0.9 % (FLUSH) 0.9 %
10 SYRINGE (ML) INJECTION PRN
Status: DISCONTINUED | OUTPATIENT
Start: 2019-10-25 | End: 2019-10-28

## 2019-10-25 RX ORDER — PROMETHAZINE HYDROCHLORIDE 25 MG/ML
12.5 INJECTION, SOLUTION INTRAMUSCULAR; INTRAVENOUS EVERY 6 HOURS PRN
Status: DISCONTINUED | OUTPATIENT
Start: 2019-10-25 | End: 2019-10-25

## 2019-10-25 RX ORDER — LIDOCAINE HYDROCHLORIDE 10 MG/ML
5 INJECTION, SOLUTION EPIDURAL; INFILTRATION; INTRACAUDAL; PERINEURAL ONCE
Status: DISCONTINUED | OUTPATIENT
Start: 2019-10-25 | End: 2019-10-28 | Stop reason: HOSPADM

## 2019-10-25 RX ORDER — HYDROCODONE BITARTRATE AND ACETAMINOPHEN 5; 325 MG/1; MG/1
1 TABLET ORAL EVERY 4 HOURS PRN
Qty: 35 TABLET | Refills: 0 | Status: SHIPPED | OUTPATIENT
Start: 2019-10-25 | End: 2019-11-01

## 2019-10-25 RX ADMIN — CEFAZOLIN SODIUM 2 G: 2 INJECTION, SOLUTION INTRAVENOUS at 13:16

## 2019-10-25 RX ADMIN — HYDROCODONE BITARTRATE AND ACETAMINOPHEN 1 TABLET: 5; 325 TABLET ORAL at 20:37

## 2019-10-25 RX ADMIN — HYDROCODONE BITARTRATE AND ACETAMINOPHEN 1 TABLET: 5; 325 TABLET ORAL at 08:50

## 2019-10-25 RX ADMIN — DOCUSATE SODIUM 100 MG: 100 CAPSULE, LIQUID FILLED ORAL at 20:37

## 2019-10-25 RX ADMIN — SODIUM CHLORIDE: 4.5 INJECTION, SOLUTION INTRAVENOUS at 02:06

## 2019-10-25 RX ADMIN — SENNOSIDES AND DOCUSATE SODIUM 1 TABLET: 8.6; 5 TABLET ORAL at 07:52

## 2019-10-25 RX ADMIN — DOCUSATE SODIUM 100 MG: 100 CAPSULE, LIQUID FILLED ORAL at 07:47

## 2019-10-25 RX ADMIN — CEFEPIME HYDROCHLORIDE 2 G: 2 INJECTION, POWDER, FOR SOLUTION INTRAVENOUS at 00:54

## 2019-10-25 RX ADMIN — ENOXAPARIN SODIUM 40 MG: 40 INJECTION SUBCUTANEOUS at 07:48

## 2019-10-25 RX ADMIN — SENNOSIDES AND DOCUSATE SODIUM 1 TABLET: 8.6; 5 TABLET ORAL at 20:37

## 2019-10-25 RX ADMIN — FAMOTIDINE 20 MG: 20 TABLET ORAL at 20:37

## 2019-10-25 RX ADMIN — Medication 10 ML: at 20:37

## 2019-10-25 RX ADMIN — VANCOMYCIN HYDROCHLORIDE 1250 MG: 10 INJECTION, POWDER, LYOPHILIZED, FOR SOLUTION INTRAVENOUS at 01:44

## 2019-10-25 RX ADMIN — HYDROCODONE BITARTRATE AND ACETAMINOPHEN 1 TABLET: 5; 325 TABLET ORAL at 15:25

## 2019-10-25 RX ADMIN — Medication 10 ML: at 07:53

## 2019-10-25 RX ADMIN — CEFAZOLIN SODIUM 2 G: 2 INJECTION, SOLUTION INTRAVENOUS at 20:36

## 2019-10-25 RX ADMIN — FAMOTIDINE 20 MG: 20 TABLET ORAL at 07:48

## 2019-10-25 ASSESSMENT — ENCOUNTER SYMPTOMS
STRIDOR: 0
COUGH: 0
SHORTNESS OF BREATH: 0
NAUSEA: 0
EYE REDNESS: 0
COLOR CHANGE: 0
EYE DISCHARGE: 0
RHINORRHEA: 0
APNEA: 0
DIARRHEA: 0
ABDOMINAL PAIN: 0
FACIAL SWELLING: 0
PHOTOPHOBIA: 0
BLOOD IN STOOL: 0
CHEST TIGHTNESS: 0
TROUBLE SWALLOWING: 0
CHOKING: 0

## 2019-10-25 ASSESSMENT — PAIN DESCRIPTION - FREQUENCY: FREQUENCY: INTERMITTENT

## 2019-10-25 ASSESSMENT — PAIN DESCRIPTION - LOCATION
LOCATION: ANKLE

## 2019-10-25 ASSESSMENT — PAIN SCALES - GENERAL
PAINLEVEL_OUTOF10: 7
PAINLEVEL_OUTOF10: 8
PAINLEVEL_OUTOF10: 0
PAINLEVEL_OUTOF10: 7
PAINLEVEL_OUTOF10: 0
PAINLEVEL_OUTOF10: 3
PAINLEVEL_OUTOF10: 0
PAINLEVEL_OUTOF10: 7

## 2019-10-25 ASSESSMENT — PAIN DESCRIPTION - DESCRIPTORS: DESCRIPTORS: THROBBING

## 2019-10-25 ASSESSMENT — PAIN DESCRIPTION - ONSET: ONSET: SUDDEN

## 2019-10-25 ASSESSMENT — PAIN DESCRIPTION - PAIN TYPE
TYPE: SURGICAL PAIN

## 2019-10-25 ASSESSMENT — PAIN DESCRIPTION - ORIENTATION
ORIENTATION: LEFT

## 2019-10-26 PROCEDURE — 6370000000 HC RX 637 (ALT 250 FOR IP): Performed by: ORTHOPAEDIC SURGERY

## 2019-10-26 PROCEDURE — 1200000000 HC SEMI PRIVATE

## 2019-10-26 PROCEDURE — 2580000003 HC RX 258: Performed by: ORTHOPAEDIC SURGERY

## 2019-10-26 PROCEDURE — 2580000003 HC RX 258: Performed by: PHYSICIAN ASSISTANT

## 2019-10-26 PROCEDURE — 6360000002 HC RX W HCPCS: Performed by: INTERNAL MEDICINE

## 2019-10-26 PROCEDURE — 6370000000 HC RX 637 (ALT 250 FOR IP): Performed by: PHYSICIAN ASSISTANT

## 2019-10-26 PROCEDURE — 6360000002 HC RX W HCPCS: Performed by: ORTHOPAEDIC SURGERY

## 2019-10-26 RX ORDER — LIDOCAINE HYDROCHLORIDE 10 MG/ML
5 INJECTION, SOLUTION EPIDURAL; INFILTRATION; INTRACAUDAL; PERINEURAL ONCE
Status: COMPLETED | OUTPATIENT
Start: 2019-10-26 | End: 2019-10-28

## 2019-10-26 RX ORDER — SODIUM CHLORIDE 0.9 % (FLUSH) 0.9 %
10 SYRINGE (ML) INJECTION PRN
Status: DISCONTINUED | OUTPATIENT
Start: 2019-10-26 | End: 2019-10-28 | Stop reason: HOSPADM

## 2019-10-26 RX ORDER — SODIUM CHLORIDE 0.9 % (FLUSH) 0.9 %
10 SYRINGE (ML) INJECTION EVERY 12 HOURS SCHEDULED
Status: DISCONTINUED | OUTPATIENT
Start: 2019-10-26 | End: 2019-10-28 | Stop reason: HOSPADM

## 2019-10-26 RX ADMIN — Medication 10 ML: at 20:28

## 2019-10-26 RX ADMIN — DOCUSATE SODIUM 100 MG: 100 CAPSULE, LIQUID FILLED ORAL at 20:28

## 2019-10-26 RX ADMIN — HYDROCODONE BITARTRATE AND ACETAMINOPHEN 1 TABLET: 5; 325 TABLET ORAL at 06:13

## 2019-10-26 RX ADMIN — HYDROCODONE BITARTRATE AND ACETAMINOPHEN 1 TABLET: 5; 325 TABLET ORAL at 22:31

## 2019-10-26 RX ADMIN — FAMOTIDINE 20 MG: 20 TABLET ORAL at 20:27

## 2019-10-26 RX ADMIN — DOCUSATE SODIUM 100 MG: 100 CAPSULE, LIQUID FILLED ORAL at 10:39

## 2019-10-26 RX ADMIN — HYDROCODONE BITARTRATE AND ACETAMINOPHEN 1 TABLET: 5; 325 TABLET ORAL at 18:07

## 2019-10-26 RX ADMIN — CEFAZOLIN SODIUM 2 G: 2 INJECTION, SOLUTION INTRAVENOUS at 20:27

## 2019-10-26 RX ADMIN — ENOXAPARIN SODIUM 40 MG: 40 INJECTION SUBCUTANEOUS at 10:39

## 2019-10-26 RX ADMIN — CEFAZOLIN SODIUM 2 G: 2 INJECTION, SOLUTION INTRAVENOUS at 13:58

## 2019-10-26 RX ADMIN — SENNOSIDES AND DOCUSATE SODIUM 1 TABLET: 8.6; 5 TABLET ORAL at 20:27

## 2019-10-26 RX ADMIN — SENNOSIDES AND DOCUSATE SODIUM 1 TABLET: 8.6; 5 TABLET ORAL at 10:38

## 2019-10-26 RX ADMIN — SODIUM CHLORIDE: 4.5 INJECTION, SOLUTION INTRAVENOUS at 05:23

## 2019-10-26 RX ADMIN — CEFAZOLIN SODIUM 2 G: 2 INJECTION, SOLUTION INTRAVENOUS at 05:22

## 2019-10-26 RX ADMIN — HYDROCODONE BITARTRATE AND ACETAMINOPHEN 1 TABLET: 5; 325 TABLET ORAL at 10:42

## 2019-10-26 RX ADMIN — FAMOTIDINE 20 MG: 20 TABLET ORAL at 10:39

## 2019-10-26 RX ADMIN — HYDROCODONE BITARTRATE AND ACETAMINOPHEN 1 TABLET: 5; 325 TABLET ORAL at 02:09

## 2019-10-26 ASSESSMENT — PAIN SCALES - GENERAL
PAINLEVEL_OUTOF10: 0
PAINLEVEL_OUTOF10: 7
PAINLEVEL_OUTOF10: 3
PAINLEVEL_OUTOF10: 7
PAINLEVEL_OUTOF10: 3
PAINLEVEL_OUTOF10: 7
PAINLEVEL_OUTOF10: 0

## 2019-10-26 ASSESSMENT — PAIN DESCRIPTION - PAIN TYPE
TYPE: SURGICAL PAIN

## 2019-10-26 ASSESSMENT — PAIN DESCRIPTION - DESCRIPTORS: DESCRIPTORS: THROBBING

## 2019-10-26 ASSESSMENT — PAIN DESCRIPTION - ONSET: ONSET: SUDDEN

## 2019-10-26 ASSESSMENT — PAIN DESCRIPTION - LOCATION
LOCATION: ANKLE

## 2019-10-26 ASSESSMENT — PAIN DESCRIPTION - ORIENTATION: ORIENTATION: LEFT

## 2019-10-26 ASSESSMENT — PAIN DESCRIPTION - FREQUENCY: FREQUENCY: INTERMITTENT

## 2019-10-27 PROCEDURE — 2580000003 HC RX 258: Performed by: ORTHOPAEDIC SURGERY

## 2019-10-27 PROCEDURE — 6360000002 HC RX W HCPCS: Performed by: ORTHOPAEDIC SURGERY

## 2019-10-27 PROCEDURE — 1200000000 HC SEMI PRIVATE

## 2019-10-27 PROCEDURE — 6370000000 HC RX 637 (ALT 250 FOR IP): Performed by: ORTHOPAEDIC SURGERY

## 2019-10-27 PROCEDURE — 6370000000 HC RX 637 (ALT 250 FOR IP): Performed by: PHYSICIAN ASSISTANT

## 2019-10-27 PROCEDURE — 2580000003 HC RX 258: Performed by: PHYSICIAN ASSISTANT

## 2019-10-27 PROCEDURE — 6360000002 HC RX W HCPCS: Performed by: INTERNAL MEDICINE

## 2019-10-27 RX ADMIN — DOCUSATE SODIUM 100 MG: 100 CAPSULE, LIQUID FILLED ORAL at 09:00

## 2019-10-27 RX ADMIN — HYDROCODONE BITARTRATE AND ACETAMINOPHEN 1 TABLET: 5; 325 TABLET ORAL at 05:35

## 2019-10-27 RX ADMIN — SENNOSIDES AND DOCUSATE SODIUM 1 TABLET: 8.6; 5 TABLET ORAL at 09:00

## 2019-10-27 RX ADMIN — CEFAZOLIN SODIUM 2 G: 2 INJECTION, SOLUTION INTRAVENOUS at 12:29

## 2019-10-27 RX ADMIN — CEFAZOLIN SODIUM 2 G: 2 INJECTION, SOLUTION INTRAVENOUS at 23:19

## 2019-10-27 RX ADMIN — DOCUSATE SODIUM 100 MG: 100 CAPSULE, LIQUID FILLED ORAL at 23:16

## 2019-10-27 RX ADMIN — HYDROCODONE BITARTRATE AND ACETAMINOPHEN 1 TABLET: 5; 325 TABLET ORAL at 16:36

## 2019-10-27 RX ADMIN — FAMOTIDINE 20 MG: 20 TABLET ORAL at 09:00

## 2019-10-27 RX ADMIN — Medication 10 ML: at 09:00

## 2019-10-27 RX ADMIN — CEFAZOLIN SODIUM 2 G: 2 INJECTION, SOLUTION INTRAVENOUS at 05:35

## 2019-10-27 RX ADMIN — Medication 10 ML: at 12:29

## 2019-10-27 RX ADMIN — FAMOTIDINE 20 MG: 20 TABLET ORAL at 23:16

## 2019-10-27 RX ADMIN — HYDROCODONE BITARTRATE AND ACETAMINOPHEN 1 TABLET: 5; 325 TABLET ORAL at 23:22

## 2019-10-27 RX ADMIN — ENOXAPARIN SODIUM 40 MG: 40 INJECTION SUBCUTANEOUS at 09:00

## 2019-10-27 RX ADMIN — Medication 10 ML: at 23:29

## 2019-10-27 RX ADMIN — SENNOSIDES AND DOCUSATE SODIUM 1 TABLET: 8.6; 5 TABLET ORAL at 23:18

## 2019-10-27 ASSESSMENT — PAIN DESCRIPTION - ORIENTATION
ORIENTATION: LEFT

## 2019-10-27 ASSESSMENT — PAIN DESCRIPTION - DESCRIPTORS
DESCRIPTORS: THROBBING

## 2019-10-27 ASSESSMENT — PAIN DESCRIPTION - ONSET
ONSET: ON-GOING
ONSET: SUDDEN

## 2019-10-27 ASSESSMENT — PAIN DESCRIPTION - FREQUENCY
FREQUENCY: INTERMITTENT

## 2019-10-27 ASSESSMENT — PAIN DESCRIPTION - PAIN TYPE
TYPE: SURGICAL PAIN

## 2019-10-27 ASSESSMENT — PAIN DESCRIPTION - LOCATION
LOCATION: ANKLE

## 2019-10-27 ASSESSMENT — PAIN SCALES - GENERAL
PAINLEVEL_OUTOF10: 3
PAINLEVEL_OUTOF10: 4
PAINLEVEL_OUTOF10: 0
PAINLEVEL_OUTOF10: 6
PAINLEVEL_OUTOF10: 4
PAINLEVEL_OUTOF10: 7
PAINLEVEL_OUTOF10: 6
PAINLEVEL_OUTOF10: 6
PAINLEVEL_OUTOF10: 7
PAINLEVEL_OUTOF10: 0
PAINLEVEL_OUTOF10: 5
PAINLEVEL_OUTOF10: 6

## 2019-10-27 ASSESSMENT — PAIN DESCRIPTION - PROGRESSION: CLINICAL_PROGRESSION: GRADUALLY IMPROVING

## 2019-10-28 VITALS
RESPIRATION RATE: 16 BRPM | TEMPERATURE: 97.5 F | DIASTOLIC BLOOD PRESSURE: 66 MMHG | HEIGHT: 60 IN | WEIGHT: 165.56 LBS | OXYGEN SATURATION: 98 % | BODY MASS INDEX: 32.51 KG/M2 | SYSTOLIC BLOOD PRESSURE: 100 MMHG | HEART RATE: 73 BPM

## 2019-10-28 LAB
BLOOD CULTURE, ROUTINE: NORMAL
CULTURE, BLOOD 2: NORMAL

## 2019-10-28 PROCEDURE — 6360000002 HC RX W HCPCS: Performed by: INTERNAL MEDICINE

## 2019-10-28 PROCEDURE — 02HV33Z INSERTION OF INFUSION DEVICE INTO SUPERIOR VENA CAVA, PERCUTANEOUS APPROACH: ICD-10-PCS | Performed by: HOSPITALIST

## 2019-10-28 PROCEDURE — 2580000003 HC RX 258: Performed by: PHYSICIAN ASSISTANT

## 2019-10-28 PROCEDURE — APPNB30 APP NON BILLABLE TIME 0-30 MINS: Performed by: NURSE PRACTITIONER

## 2019-10-28 PROCEDURE — 6360000002 HC RX W HCPCS: Performed by: ORTHOPAEDIC SURGERY

## 2019-10-28 PROCEDURE — 99024 POSTOP FOLLOW-UP VISIT: CPT | Performed by: NURSE PRACTITIONER

## 2019-10-28 PROCEDURE — 6370000000 HC RX 637 (ALT 250 FOR IP): Performed by: ORTHOPAEDIC SURGERY

## 2019-10-28 PROCEDURE — 36569 INSJ PICC 5 YR+ W/O IMAGING: CPT

## 2019-10-28 PROCEDURE — C1751 CATH, INF, PER/CENT/MIDLINE: HCPCS

## 2019-10-28 PROCEDURE — 2500000003 HC RX 250 WO HCPCS: Performed by: PHYSICIAN ASSISTANT

## 2019-10-28 PROCEDURE — 99232 SBSQ HOSP IP/OBS MODERATE 35: CPT | Performed by: INTERNAL MEDICINE

## 2019-10-28 RX ORDER — IBUPROFEN 400 MG/1
400 TABLET ORAL EVERY 6 HOURS PRN
Qty: 120 TABLET | Refills: 0 | COMMUNITY
Start: 2019-10-28 | End: 2019-10-31

## 2019-10-28 RX ADMIN — CEFAZOLIN SODIUM 2 G: 2 INJECTION, SOLUTION INTRAVENOUS at 13:20

## 2019-10-28 RX ADMIN — FAMOTIDINE 20 MG: 20 TABLET ORAL at 08:29

## 2019-10-28 RX ADMIN — LIDOCAINE HYDROCHLORIDE 5 ML: 10 INJECTION, SOLUTION EPIDURAL; INFILTRATION; INTRACAUDAL; PERINEURAL at 12:44

## 2019-10-28 RX ADMIN — CEFAZOLIN SODIUM 2 G: 2 INJECTION, SOLUTION INTRAVENOUS at 06:01

## 2019-10-28 RX ADMIN — DOCUSATE SODIUM 100 MG: 100 CAPSULE, LIQUID FILLED ORAL at 08:29

## 2019-10-28 RX ADMIN — ENOXAPARIN SODIUM 40 MG: 40 INJECTION SUBCUTANEOUS at 08:29

## 2019-10-28 RX ADMIN — Medication 10 ML: at 08:30

## 2019-10-28 RX ADMIN — SENNOSIDES AND DOCUSATE SODIUM 1 TABLET: 8.6; 5 TABLET ORAL at 08:29

## 2019-10-28 ASSESSMENT — ENCOUNTER SYMPTOMS
COUGH: 0
CHOKING: 0
COLOR CHANGE: 0
DIARRHEA: 0
STRIDOR: 0
APNEA: 0
SHORTNESS OF BREATH: 0
PHOTOPHOBIA: 0
EYE DISCHARGE: 0
BLOOD IN STOOL: 0
FACIAL SWELLING: 0
CHEST TIGHTNESS: 0
NAUSEA: 0
ABDOMINAL PAIN: 0
RHINORRHEA: 0
TROUBLE SWALLOWING: 0
EYE REDNESS: 0

## 2019-10-28 ASSESSMENT — PAIN SCALES - GENERAL
PAINLEVEL_OUTOF10: 0
PAINLEVEL_OUTOF10: 5
PAINLEVEL_OUTOF10: 0

## 2019-10-29 LAB
CULTURE, JOINT AEROBIC: ABNORMAL
CULTURE, JOINT ANAEROBIC: ABNORMAL
ORGANISM: ABNORMAL

## 2019-10-31 ENCOUNTER — OFFICE VISIT (OUTPATIENT)
Dept: PRIMARY CARE CLINIC | Age: 54
End: 2019-10-31

## 2019-10-31 VITALS
SYSTOLIC BLOOD PRESSURE: 140 MMHG | RESPIRATION RATE: 16 BRPM | HEIGHT: 60 IN | HEART RATE: 99 BPM | OXYGEN SATURATION: 99 % | BODY MASS INDEX: 32 KG/M2 | DIASTOLIC BLOOD PRESSURE: 90 MMHG | WEIGHT: 163 LBS | TEMPERATURE: 98.3 F

## 2019-10-31 DIAGNOSIS — I10 ESSENTIAL HYPERTENSION: ICD-10-CM

## 2019-10-31 DIAGNOSIS — A49.01 MSSA (METHICILLIN SUSCEPTIBLE STAPHYLOCOCCUS AUREUS) INFECTION: Primary | ICD-10-CM

## 2019-10-31 DIAGNOSIS — M00.072 STAPHYLOCOCCAL ARTHRITIS OF LEFT ANKLE (HCC): ICD-10-CM

## 2019-10-31 PROCEDURE — 99203 OFFICE O/P NEW LOW 30 MIN: CPT | Performed by: INTERNAL MEDICINE

## 2019-10-31 RX ORDER — CEFAZOLIN 1 G/1
2 INJECTION, POWDER, FOR SOLUTION INTRAVENOUS 3 TIMES DAILY
COMMUNITY
End: 2020-02-03 | Stop reason: ALTCHOICE

## 2019-10-31 ASSESSMENT — ENCOUNTER SYMPTOMS
BLOOD IN STOOL: 0
EYE PAIN: 0
RHINORRHEA: 0
CHEST TIGHTNESS: 0
SORE THROAT: 0
VOMITING: 0
WHEEZING: 0
SHORTNESS OF BREATH: 0
SINUS PRESSURE: 0
COUGH: 0
EYE DISCHARGE: 0
NAUSEA: 0
TROUBLE SWALLOWING: 0
ABDOMINAL PAIN: 0
SINUS PAIN: 0

## 2019-11-05 LAB
ALBUMIN SERPL-MCNC: 3.6 G/DL (ref 3.5–5)
ALP BLD-CCNC: 67 IU/L (ref 35–135)
ALT SERPL-CCNC: 11 IU/L (ref 10–60)
ANION GAP SERPL CALCULATED.3IONS-SCNC: 8 MMOL/L (ref 6–18)
AST SERPL-CCNC: 19 IU/L (ref 10–40)
BASOPHILS ABSOLUTE: 0 THOU/MCL (ref 0–0.2)
BASOPHILS ABSOLUTE: 1 %
BILIRUB SERPL-MCNC: 0.6 MG/DL (ref 0–1.2)
BUN BLDV-MCNC: 21 MG/DL (ref 8–26)
C-REACTIVE PROTEIN WIDE RANGE: 1.9 MG/L
CALCIUM SERPL-MCNC: 9.2 MG/DL (ref 8.5–10.5)
CHLORIDE BLD-SCNC: 105 MEQ/L (ref 101–111)
CO2: 26 MMOL/L (ref 24–36)
CREAT SERPL-MCNC: 0.44 MG/DL (ref 0.44–1.03)
EOSINOPHILS ABSOLUTE: 0.1 THOU/MCL (ref 0.03–0.45)
EOSINOPHILS RELATIVE PERCENT: 2 %
GFR AFRICAN AMERICAN: 132 ML/MIN/1.73 M2
GFR NON-AFRICAN AMERICAN: 114 ML/MIN/1.73 M2
GLUCOSE BLD-MCNC: 78 MG/DL (ref 70–99)
HCT VFR BLD CALC: 38 % (ref 36–46)
HEMOGLOBIN: 12.5 G/DL (ref 12–15.2)
LYMPHOCYTES ABSOLUTE: 1.5 THOU/MCL (ref 1–4)
LYMPHOCYTES RELATIVE PERCENT: 35 %
MCH RBC QN AUTO: 28.2 PG (ref 27–33)
MCHC RBC AUTO-ENTMCNC: 32.9 G/DL (ref 32–36)
MCV RBC AUTO: 85.5 FL (ref 82–97)
MONOCYTES # BLD: 7 %
MONOCYTES ABSOLUTE: 0.3 THOU/MCL (ref 0.2–0.9)
NEUTROPHILS ABSOLUTE: 2.5 THOU/MCL (ref 1.8–7.7)
PDW BLD-RTO: 14.3 %
PLATELET # BLD: 262 THOU/MCL (ref 140–375)
PMV BLD AUTO: 10 FL (ref 7.4–11.5)
POTASSIUM SERPL-SCNC: 4.4 MEQ/L (ref 3.6–5.1)
RBC # BLD: 4.44 MIL/MCL (ref 3.8–5.2)
SEDIMENTATION RATE, ERYTHROCYTE: 59 MM/HR (ref 0–30)
SEG NEUTROPHILS: 55 %
SODIUM BLD-SCNC: 139 MEQ/L (ref 135–145)
TOTAL PROTEIN: 7.6 G/DL (ref 6–8)
WBC # BLD: 4.4 THOU/MCL (ref 3.6–10.5)

## 2019-11-07 ENCOUNTER — OFFICE VISIT (OUTPATIENT)
Dept: ORTHOPEDIC SURGERY | Age: 54
End: 2019-11-07

## 2019-11-07 VITALS — HEIGHT: 59 IN | BODY MASS INDEX: 32.86 KG/M2 | WEIGHT: 163 LBS

## 2019-11-07 DIAGNOSIS — M00.9 SEPTIC ARTHRITIS OF LEFT ANKLE, DUE TO UNSPECIFIED ORGANISM (HCC): Primary | ICD-10-CM

## 2019-11-07 PROCEDURE — 99024 POSTOP FOLLOW-UP VISIT: CPT | Performed by: ORTHOPAEDIC SURGERY

## 2019-11-11 LAB
CULTURE, JOINT AEROBIC: NORMAL
CULTURE, JOINT ANAEROBIC: NORMAL

## 2019-11-12 LAB
ALBUMIN SERPL-MCNC: 3.6 G/DL (ref 3.5–5)
ALP BLD-CCNC: 65 IU/L (ref 35–135)
ALT SERPL-CCNC: 9 IU/L (ref 10–60)
ANION GAP SERPL CALCULATED.3IONS-SCNC: 6 MMOL/L (ref 6–18)
AST SERPL-CCNC: 20 IU/L (ref 10–40)
BASOPHILS ABSOLUTE: 0.1 THOU/MCL (ref 0–0.2)
BASOPHILS ABSOLUTE: 1 %
BILIRUB SERPL-MCNC: 0.4 MG/DL (ref 0–1.2)
BUN BLDV-MCNC: 15 MG/DL (ref 8–26)
C-REACTIVE PROTEIN WIDE RANGE: 1.2 MG/L
CALCIUM SERPL-MCNC: 9.1 MG/DL (ref 8.5–10.5)
CHLORIDE BLD-SCNC: 106 MEQ/L (ref 101–111)
CO2: 28 MMOL/L (ref 24–36)
CREAT SERPL-MCNC: 0.5 MG/DL (ref 0.44–1.03)
EOSINOPHILS ABSOLUTE: 0.1 THOU/MCL (ref 0.03–0.45)
EOSINOPHILS RELATIVE PERCENT: 3 %
GFR AFRICAN AMERICAN: 126 ML/MIN/1.73 M2
GFR NON-AFRICAN AMERICAN: 110 ML/MIN/1.73 M2
GLUCOSE BLD-MCNC: 107 MG/DL (ref 70–99)
HCT VFR BLD CALC: 37.7 % (ref 36–46)
HEMOGLOBIN: 12.6 G/DL (ref 12–15.2)
LYMPHOCYTES ABSOLUTE: 1.6 THOU/MCL (ref 1–4)
LYMPHOCYTES RELATIVE PERCENT: 37 %
MCH RBC QN AUTO: 28.6 PG (ref 27–33)
MCHC RBC AUTO-ENTMCNC: 33.3 G/DL (ref 32–36)
MCV RBC AUTO: 86 FL (ref 82–97)
MONOCYTES # BLD: 5 %
MONOCYTES ABSOLUTE: 0.2 THOU/MCL (ref 0.2–0.9)
NEUTROPHILS ABSOLUTE: 2.2 THOU/MCL (ref 1.8–7.7)
PDW BLD-RTO: 14.1 %
PLATELET # BLD: 246 THOU/MCL (ref 140–375)
PMV BLD AUTO: 9.8 FL (ref 7.4–11.5)
POTASSIUM SERPL-SCNC: 4 MEQ/L (ref 3.6–5.1)
RBC # BLD: 4.39 MIL/MCL (ref 3.8–5.2)
SEDIMENTATION RATE, ERYTHROCYTE: 60 MM/HR (ref 0–30)
SEG NEUTROPHILS: 54 %
SODIUM BLD-SCNC: 140 MEQ/L (ref 135–145)
TOTAL PROTEIN: 7 G/DL (ref 6–8)
WBC # BLD: 4.2 THOU/MCL (ref 3.6–10.5)

## 2019-11-14 ENCOUNTER — OFFICE VISIT (OUTPATIENT)
Dept: ORTHOPEDIC SURGERY | Age: 54
End: 2019-11-14

## 2019-11-14 VITALS — HEIGHT: 59 IN | BODY MASS INDEX: 32.86 KG/M2 | WEIGHT: 163 LBS | RESPIRATION RATE: 16 BRPM

## 2019-11-14 DIAGNOSIS — M00.072 STAPHYLOCOCCAL ARTHRITIS OF LEFT ANKLE (HCC): Primary | ICD-10-CM

## 2019-11-14 PROCEDURE — 99024 POSTOP FOLLOW-UP VISIT: CPT | Performed by: ORTHOPAEDIC SURGERY

## 2019-11-19 LAB
ALBUMIN SERPL-MCNC: 3.7 G/DL (ref 3.5–5)
ALP BLD-CCNC: 64 IU/L (ref 35–135)
ALT SERPL-CCNC: 7 IU/L (ref 10–60)
ANION GAP SERPL CALCULATED.3IONS-SCNC: 8 MMOL/L (ref 6–18)
AST SERPL-CCNC: 16 IU/L (ref 10–40)
BASOPHILS ABSOLUTE: 0 THOU/MCL (ref 0–0.2)
BASOPHILS ABSOLUTE: 1 %
BILIRUB SERPL-MCNC: 0.5 MG/DL (ref 0–1.2)
BUN BLDV-MCNC: 13 MG/DL (ref 8–26)
C-REACTIVE PROTEIN WIDE RANGE: 1.9 MG/L
CALCIUM SERPL-MCNC: 9 MG/DL (ref 8.5–10.5)
CHLORIDE BLD-SCNC: 103 MEQ/L (ref 101–111)
CO2: 26 MMOL/L (ref 24–36)
CREAT SERPL-MCNC: 0.54 MG/DL (ref 0.44–1.03)
EOSINOPHILS ABSOLUTE: 0.1 THOU/MCL (ref 0.03–0.45)
EOSINOPHILS RELATIVE PERCENT: 3 %
GFR AFRICAN AMERICAN: 123 ML/MIN/1.73 M2
GFR NON-AFRICAN AMERICAN: 107 ML/MIN/1.73 M2
GLUCOSE BLD-MCNC: 79 MG/DL (ref 70–99)
HCT VFR BLD CALC: 36.3 % (ref 36–46)
HEMOGLOBIN: 12 G/DL (ref 12–15.2)
LYMPHOCYTES ABSOLUTE: 1.4 THOU/MCL (ref 1–4)
LYMPHOCYTES RELATIVE PERCENT: 32 %
MCH RBC QN AUTO: 28.4 PG (ref 27–33)
MCHC RBC AUTO-ENTMCNC: 33.2 G/DL (ref 32–36)
MCV RBC AUTO: 85.6 FL (ref 82–97)
MONOCYTES # BLD: 10 %
MONOCYTES ABSOLUTE: 0.4 THOU/MCL (ref 0.2–0.9)
NEUTROPHILS ABSOLUTE: 2.4 THOU/MCL (ref 1.8–7.7)
PDW BLD-RTO: 14.6 %
PLATELET # BLD: 229 THOU/MCL (ref 140–375)
PMV BLD AUTO: 9.3 FL (ref 7.4–11.5)
POTASSIUM SERPL-SCNC: 4.1 MEQ/L (ref 3.6–5.1)
RBC # BLD: 4.24 MIL/MCL (ref 3.8–5.2)
SEDIMENTATION RATE, ERYTHROCYTE: 35 MM/HR (ref 0–30)
SEG NEUTROPHILS: 54 %
SODIUM BLD-SCNC: 137 MEQ/L (ref 135–145)
TOTAL PROTEIN: 6.9 G/DL (ref 6–8)
WBC # BLD: 4.3 THOU/MCL (ref 3.6–10.5)

## 2019-11-26 ENCOUNTER — TELEPHONE (OUTPATIENT)
Dept: ORTHOPEDIC SURGERY | Age: 54
End: 2019-11-26

## 2019-11-26 ENCOUNTER — OFFICE VISIT (OUTPATIENT)
Dept: ORTHOPEDIC SURGERY | Age: 54
End: 2019-11-26
Payer: MEDICAID

## 2019-11-26 ENCOUNTER — TELEPHONE (OUTPATIENT)
Dept: INFECTIOUS DISEASES | Age: 54
End: 2019-11-26

## 2019-11-26 VITALS
RESPIRATION RATE: 16 BRPM | BODY MASS INDEX: 32.86 KG/M2 | HEART RATE: 80 BPM | HEIGHT: 59 IN | WEIGHT: 163 LBS | TEMPERATURE: 98.8 F

## 2019-11-26 DIAGNOSIS — M65.9 SYNOVITIS OF LEFT ANKLE: ICD-10-CM

## 2019-11-26 DIAGNOSIS — M00.9 SEPTIC ARTHRITIS OF LEFT ANKLE, DUE TO UNSPECIFIED ORGANISM (HCC): Primary | ICD-10-CM

## 2019-11-26 LAB
ALBUMIN SERPL-MCNC: 3.7 G/DL (ref 3.5–5)
ALP BLD-CCNC: 65 IU/L (ref 35–135)
ALT SERPL-CCNC: 7 IU/L (ref 10–60)
ANION GAP SERPL CALCULATED.3IONS-SCNC: 7 MMOL/L (ref 6–18)
AST SERPL-CCNC: 20 IU/L (ref 10–40)
BASOPHILS ABSOLUTE: 0 THOU/MCL (ref 0–0.2)
BASOPHILS ABSOLUTE: 1 %
BILIRUB SERPL-MCNC: 0.4 MG/DL (ref 0–1.2)
BUN BLDV-MCNC: 17 MG/DL (ref 8–26)
C-REACTIVE PROTEIN WIDE RANGE: 2 MG/L
CALCIUM SERPL-MCNC: 9 MG/DL (ref 8.5–10.5)
CHLORIDE BLD-SCNC: 107 MEQ/L (ref 101–111)
CO2: 25 MMOL/L (ref 24–36)
CREAT SERPL-MCNC: 0.5 MG/DL (ref 0.44–1.03)
EOSINOPHILS ABSOLUTE: 0.2 THOU/MCL (ref 0.03–0.45)
EOSINOPHILS RELATIVE PERCENT: 5 %
GFR AFRICAN AMERICAN: 126 ML/MIN/1.73 M2
GFR NON-AFRICAN AMERICAN: 110 ML/MIN/1.73 M2
GLUCOSE BLD-MCNC: 81 MG/DL (ref 70–99)
HCT VFR BLD CALC: 36 % (ref 36–46)
HEMOGLOBIN: 12.2 G/DL (ref 12–15.2)
LYMPHOCYTES ABSOLUTE: 0.9 THOU/MCL (ref 1–4)
LYMPHOCYTES RELATIVE PERCENT: 22 %
MCH RBC QN AUTO: 29 PG (ref 27–33)
MCHC RBC AUTO-ENTMCNC: 34 G/DL (ref 32–36)
MCV RBC AUTO: 85.1 FL (ref 82–97)
MONOCYTES # BLD: 9 %
MONOCYTES ABSOLUTE: 0.4 THOU/MCL (ref 0.2–0.9)
NEUTROPHILS ABSOLUTE: 2.5 THOU/MCL (ref 1.8–7.7)
PDW BLD-RTO: 14.4 %
PLATELET # BLD: 197 THOU/MCL (ref 140–375)
PMV BLD AUTO: 10.1 FL (ref 7.4–11.5)
POTASSIUM SERPL-SCNC: 4.1 MEQ/L (ref 3.6–5.1)
RBC # BLD: 4.23 MIL/MCL (ref 3.8–5.2)
SEDIMENTATION RATE, ERYTHROCYTE: 32 MM/HR (ref 0–30)
SEG NEUTROPHILS: 63 %
SODIUM BLD-SCNC: 139 MEQ/L (ref 135–145)
TOTAL PROTEIN: 7 G/DL (ref 6–8)
WBC # BLD: 4 THOU/MCL (ref 3.6–10.5)

## 2019-11-26 PROCEDURE — 99213 OFFICE O/P EST LOW 20 MIN: CPT | Performed by: ORTHOPAEDIC SURGERY

## 2019-12-02 LAB
ALBUMIN SERPL-MCNC: 3.7 G/DL (ref 3.5–5)
ALP BLD-CCNC: 62 IU/L (ref 35–135)
ALT SERPL-CCNC: 12 IU/L (ref 10–60)
ANION GAP SERPL CALCULATED.3IONS-SCNC: 9 MMOL/L (ref 6–18)
AST SERPL-CCNC: 25 IU/L (ref 10–40)
BILIRUB SERPL-MCNC: 0.6 MG/DL (ref 0–1.2)
BUN BLDV-MCNC: 15 MG/DL (ref 8–26)
C-REACTIVE PROTEIN WIDE RANGE: 1.5 MG/L
CALCIUM SERPL-MCNC: 9.2 MG/DL (ref 8.5–10.5)
CHLORIDE BLD-SCNC: 104 MEQ/L (ref 101–111)
CO2: 27 MMOL/L (ref 24–36)
CREAT SERPL-MCNC: 0.43 MG/DL (ref 0.44–1.03)
GFR AFRICAN AMERICAN: 133 ML/MIN/1.73 M2
GFR NON-AFRICAN AMERICAN: 115 ML/MIN/1.73 M2
GLUCOSE BLD-MCNC: 77 MG/DL (ref 70–99)
HCT VFR BLD CALC: 37.9 % (ref 36–46)
HEMOGLOBIN: 12.5 G/DL (ref 12–15.2)
MCH RBC QN AUTO: 28.1 PG (ref 27–33)
MCHC RBC AUTO-ENTMCNC: 32.9 G/DL (ref 32–36)
MCV RBC AUTO: 85.5 FL (ref 82–97)
PDW BLD-RTO: 14.3 %
PLATELET # BLD: 235 THOU/MCL (ref 140–375)
PMV BLD AUTO: 9.1 FL (ref 7.4–11.5)
POTASSIUM SERPL-SCNC: 4 MEQ/L (ref 3.6–5.1)
RBC # BLD: 4.43 MIL/MCL (ref 3.8–5.2)
SEDIMENTATION RATE, ERYTHROCYTE: 26 MM/HR (ref 0–30)
SODIUM BLD-SCNC: 140 MEQ/L (ref 135–145)
TOTAL PROTEIN: 7.1 G/DL (ref 6–8)
WBC # BLD: 3.9 THOU/MCL (ref 3.6–10.5)

## 2019-12-09 LAB
ALBUMIN SERPL-MCNC: 3.6 G/DL (ref 3.5–5)
ALP BLD-CCNC: 57 IU/L (ref 35–135)
ALT SERPL-CCNC: 8 IU/L (ref 10–60)
ANION GAP SERPL CALCULATED.3IONS-SCNC: 7 MMOL/L (ref 6–18)
AST SERPL-CCNC: 17 IU/L (ref 10–40)
BASOPHILS ABSOLUTE: 0 THOU/MCL (ref 0–0.2)
BASOPHILS ABSOLUTE: 1 %
BILIRUB SERPL-MCNC: 0.2 MG/DL (ref 0–1.2)
BUN BLDV-MCNC: 20 MG/DL (ref 8–26)
CALCIUM SERPL-MCNC: 8.9 MG/DL (ref 8.5–10.5)
CHLORIDE BLD-SCNC: 108 MEQ/L (ref 101–111)
CO2: 26 MMOL/L (ref 24–36)
CREAT SERPL-MCNC: 0.58 MG/DL (ref 0.44–1.03)
EOSINOPHILS ABSOLUTE: 0.2 THOU/MCL (ref 0.03–0.45)
EOSINOPHILS RELATIVE PERCENT: 6 %
GFR AFRICAN AMERICAN: 120 ML/MIN/1.73 M2
GFR NON-AFRICAN AMERICAN: 104 ML/MIN/1.73 M2
GLUCOSE BLD-MCNC: 83 MG/DL (ref 70–99)
HCT VFR BLD CALC: 36.6 % (ref 36–46)
HEMOGLOBIN: 12.3 G/DL (ref 12–15.2)
LYMPHOCYTES ABSOLUTE: 1 THOU/MCL (ref 1–4)
LYMPHOCYTES RELATIVE PERCENT: 27 %
MCH RBC QN AUTO: 28.9 PG (ref 27–33)
MCHC RBC AUTO-ENTMCNC: 33.5 G/DL (ref 32–36)
MCV RBC AUTO: 86.2 FL (ref 82–97)
MONOCYTES # BLD: 9 %
MONOCYTES ABSOLUTE: 0.4 THOU/MCL (ref 0.2–0.9)
NEUTROPHILS ABSOLUTE: 2.3 THOU/MCL (ref 1.8–7.7)
PDW BLD-RTO: 13.9 %
PLATELET # BLD: 214 THOU/MCL (ref 140–375)
PMV BLD AUTO: 9.3 FL (ref 7.4–11.5)
POTASSIUM SERPL-SCNC: 4 MEQ/L (ref 3.6–5.1)
RBC # BLD: 4.24 MIL/MCL (ref 3.8–5.2)
SEDIMENTATION RATE, ERYTHROCYTE: 24 MM/HR (ref 0–30)
SEG NEUTROPHILS: 57 %
SODIUM BLD-SCNC: 141 MEQ/L (ref 135–145)
TOTAL PROTEIN: 7.1 G/DL (ref 6–8)
WBC # BLD: 3.9 THOU/MCL (ref 3.6–10.5)

## 2019-12-10 ENCOUNTER — OFFICE VISIT (OUTPATIENT)
Dept: INFECTIOUS DISEASES | Age: 54
End: 2019-12-10

## 2019-12-10 VITALS
DIASTOLIC BLOOD PRESSURE: 76 MMHG | RESPIRATION RATE: 18 BRPM | WEIGHT: 163 LBS | BODY MASS INDEX: 32.86 KG/M2 | OXYGEN SATURATION: 95 % | HEIGHT: 59 IN | HEART RATE: 83 BPM | SYSTOLIC BLOOD PRESSURE: 122 MMHG

## 2019-12-10 DIAGNOSIS — Z60.3 IMMIGRANT WITH LANGUAGE DIFFICULTY: ICD-10-CM

## 2019-12-10 DIAGNOSIS — M00.072 STAPHYLOCOCCAL ARTHRITIS OF LEFT ANKLE (HCC): Primary | ICD-10-CM

## 2019-12-10 DIAGNOSIS — R70.0 ELEVATED SED RATE: ICD-10-CM

## 2019-12-10 DIAGNOSIS — Z45.2 PICC (PERIPHERALLY INSERTED CENTRAL CATHETER) REMOVAL: ICD-10-CM

## 2019-12-10 DIAGNOSIS — R79.82 ELEVATED C-REACTIVE PROTEIN (CRP): ICD-10-CM

## 2019-12-10 DIAGNOSIS — Z91.048 ALLERGY TO ADHESIVE TAPE: ICD-10-CM

## 2019-12-10 DIAGNOSIS — Z71.3 WEIGHT LOSS COUNSELING, ENCOUNTER FOR: ICD-10-CM

## 2019-12-10 DIAGNOSIS — Z45.2 PICC (PERIPHERALLY INSERTED CENTRAL CATHETER) IN PLACE: ICD-10-CM

## 2019-12-10 DIAGNOSIS — E66.09 CLASS 1 OBESITY DUE TO EXCESS CALORIES WITHOUT SERIOUS COMORBIDITY WITH BODY MASS INDEX (BMI) OF 32.0 TO 32.9 IN ADULT: ICD-10-CM

## 2019-12-10 DIAGNOSIS — A49.01 MSSA (METHICILLIN SUSCEPTIBLE STAPHYLOCOCCUS AUREUS) INFECTION: ICD-10-CM

## 2019-12-10 DIAGNOSIS — L23.9 ALLERGIC DERMATITIS: ICD-10-CM

## 2019-12-10 PROCEDURE — 99215 OFFICE O/P EST HI 40 MIN: CPT | Performed by: INTERNAL MEDICINE

## 2019-12-10 RX ORDER — CEPHALEXIN 500 MG/1
500 CAPSULE ORAL 4 TIMES DAILY
Qty: 60 CAPSULE | Refills: 0 | Status: SHIPPED | OUTPATIENT
Start: 2019-12-10 | End: 2019-12-25

## 2019-12-10 RX ORDER — DIAPER,BRIEF,INFANT-TODD,DISP
EACH MISCELLANEOUS
Qty: 1 TUBE | Refills: 0 | Status: SHIPPED | OUTPATIENT
Start: 2019-12-10 | End: 2019-12-17

## 2019-12-10 SDOH — SOCIAL STABILITY - SOCIAL INSECURITY: ACCULTURATION DIFFICULTY: Z60.3

## 2019-12-10 ASSESSMENT — ENCOUNTER SYMPTOMS
ABDOMINAL PAIN: 0
COLOR CHANGE: 0
EYE REDNESS: 0
EYE DISCHARGE: 0
BLOOD IN STOOL: 0
STRIDOR: 0
SHORTNESS OF BREATH: 0
DIARRHEA: 0
COUGH: 0
PHOTOPHOBIA: 0
CHEST TIGHTNESS: 0
FACIAL SWELLING: 0
NAUSEA: 0
CHOKING: 0
TROUBLE SWALLOWING: 0
RHINORRHEA: 0
APNEA: 0

## 2020-01-02 ENCOUNTER — OFFICE VISIT (OUTPATIENT)
Dept: ORTHOPEDIC SURGERY | Age: 55
End: 2020-01-02

## 2020-01-02 VITALS — WEIGHT: 163 LBS | HEIGHT: 59 IN | BODY MASS INDEX: 32.86 KG/M2 | HEART RATE: 76 BPM | RESPIRATION RATE: 16 BRPM

## 2020-01-02 PROCEDURE — 99213 OFFICE O/P EST LOW 20 MIN: CPT | Performed by: NURSE PRACTITIONER

## 2020-01-02 NOTE — PROGRESS NOTES
DIAGNOSIS:  Left ankle septic arthritis abscess, status post arthrotomy with incision and drainage. DATE OF SURGERY: 10/24/2019. HISTORY OF PRESENT ILLNESS:  Ms. Dahiana Delgado 48 y.o.  female who came in today for postoperative visit. There is an  here for the entire visit. The patient denies any significant pain in the left ankle. She states her pain is much improved since his surgery. She has been WB and doing better, but still using 2 crutches as she is afraid to walk much. No numbness or tingling sensation. No fever or Chills. She grew up MSSA and completed IV antibiotics through a PICC managed by Dr Mikie Gonzalez, infectious disease, and was switched to oral antibiotics, Keflex, which she has completed. No past medical history on file.   Past Surgical History:   Procedure Laterality Date    ANKLE SURGERY Left 10/24/2019    LEFT ANKLE INCISION AND DRAINAGE OF OSTEOMYELITIS AND ARTHROTOMY OF LEFT ANKLE performed by Woody Nichols MD at [place] [address]       Family History   Problem Relation Age of Onset    High Blood Pressure Mother     Diabetes Sister     Diabetes Brother      Social History     Socioeconomic History    Marital status:      Spouse name: Not on file    Number of children: Not on file    Years of education: Not on file    Highest education level: Not on file   Occupational History    Not on file   Social Needs    Financial resource strain: Not on file    Food insecurity:     Worry: Not on file     Inability: Not on file    Transportation needs:     Medical: Not on file     Non-medical: Not on file   Tobacco Use    Smoking status: Never Smoker    Smokeless tobacco: Never Used   Substance and Sexual Activity    Alcohol use: No    Drug use: No    Sexual activity: Not on file   Lifestyle    Physical activity:     Days per week: Not on file     Minutes per session: Not on file    Stress: Not on file   Relationships    Social connections: Talks on phone: Not on file     Gets together: Not on file     Attends Anabaptism service: Not on file     Active member of club or organization: Not on file     Attends meetings of clubs or organizations: Not on file     Relationship status: Not on file    Intimate partner violence:     Fear of current or ex partner: Not on file     Emotionally abused: Not on file     Physically abused: Not on file     Forced sexual activity: Not on file   Other Topics Concern    Not on file   Social History Narrative    Not on file     Current Outpatient Medications   Medication Sig Dispense Refill    ceFAZolin (ANCEF) 1 g injection Infuse 2 g intravenously 3 times daily       No current facility-administered medications for this visit. Pertinent items are noted in HPI  Review of systems reviewed from Patient History Form dated on 8/6/2019 and available in the patient's chart under the Media tab. No change noted. PHYSICAL EXAMINATION:  Ms. Renetta Ortiz is a very pleasant 48 y.o.  female who presents today in no acute distress, awake, alert, and oriented. She is well dressed, nourished and  groomed. Patient with normal affect. Height is  4' 11\" (1.499 m), weight is 163 lb (73.9 kg), Body mass index is 32.92 kg/m². Resting respiratory rate is 16. She ambulates weightbearing with assistance of 2 crutches. The incision is now completely healed. No drainage or erythema. Mild swelling, no warmth. She has no pain with the active or passive range of motion of the left ankle, decreased ROM. She has intact sensation distally, and she is neurovascularly intact. Ankle reflex 1+ bilaterally. Good strength, and no instability both upper and lower extremities. IMAGING:  X-rays were taken in the office 11/7/2019, 3 views of the left ankle, and showed no fracture. No other abnormality. Surgical pathology consistent with inflamed synovial tissue.     IMPRESSION:  10 weeks out from left ankle septic arthritis, abscess incision and drainage and arthrotomy    PLAN: She can WBAT. I discussed with the patient that I think that she would really benefit from a course of physical therapy for further strengthening and stretching. An Rx for physical therapy was given to the patient. The patient will come back for a follow up in 6 weeks. The patient understands that there is a chance of abscess recurrence even after surgical I&D.       Dana Chinchilla, LAURI - CNP

## 2020-02-03 ENCOUNTER — OFFICE VISIT (OUTPATIENT)
Dept: PRIMARY CARE CLINIC | Age: 55
End: 2020-02-03

## 2020-02-03 VITALS
HEART RATE: 89 BPM | HEIGHT: 58 IN | SYSTOLIC BLOOD PRESSURE: 130 MMHG | OXYGEN SATURATION: 99 % | BODY MASS INDEX: 39.25 KG/M2 | RESPIRATION RATE: 16 BRPM | DIASTOLIC BLOOD PRESSURE: 62 MMHG | WEIGHT: 187 LBS | TEMPERATURE: 98.3 F

## 2020-02-03 PROBLEM — Z13.1 SCREENING FOR DIABETES MELLITUS: Status: ACTIVE | Noted: 2020-02-03

## 2020-02-03 PROBLEM — Z13.220 SCREENING FOR HYPERLIPIDEMIA: Status: ACTIVE | Noted: 2020-02-03

## 2020-02-03 PROBLEM — Z12.11 SCREENING FOR COLON CANCER: Status: ACTIVE | Noted: 2020-02-03

## 2020-02-03 PROCEDURE — 99213 OFFICE O/P EST LOW 20 MIN: CPT | Performed by: INTERNAL MEDICINE

## 2020-02-03 ASSESSMENT — ENCOUNTER SYMPTOMS
BLOOD IN STOOL: 0
WHEEZING: 0
CHEST TIGHTNESS: 0
TROUBLE SWALLOWING: 0
COUGH: 0
SINUS PAIN: 0
SINUS PRESSURE: 0
EYE DISCHARGE: 0
RHINORRHEA: 0
EYE PAIN: 0
NAUSEA: 0
VOMITING: 0
SORE THROAT: 0
SHORTNESS OF BREATH: 0
ABDOMINAL PAIN: 0

## 2020-02-03 NOTE — PATIENT INSTRUCTIONS
Keep follow-up with orthopedics. Keep trying to lose weight with lean low-carb diet what she has done before and lost weight. Fasting blood work in 3 months and follow-up after that. Screening colonoscopy in couple of months with gastroenterologist.    Can use pedlar to do exercises. Elliptical machine or sitting health rider's to avoid strain on the ankle. Get flu shot and shingles and tetanus Tdap shot from the pharmacy.

## 2020-02-03 NOTE — PROGRESS NOTES
2/3/2020    Lore Mata (: 1965) is a 48 y.o. female, here for evaluation of the following medical concerns:    Chief Complaint   Patient presents with    2 Week Follow-Up     FOLLOW UP ON LEFT ANKLE        Cellulitis left ankle medial part-her antibiotics are over and infection is all cleared up. She does not remember injuring the ankle. She had seen Dr. Rd Plaza December 10 and he cleared her at that time. Her CBC was normal  on reviewing sed rate was 24 and CRP was 1.50 on 2019. Her kidney function  looked fine. Her sugar was 83. She needs to do ankle exercises more like pedlar or elliptical not do any treadmill or running. Usual walking is okay. Not doing any uphill machines either. Keep up with orthopedics follow-up and see what they recommend. She has gained weight problem with his ankle septic arthritis. She used to be 240 pounds and she did not lose a lot of weight with watching diet. She is going to work on the diet more to lose weight. Screening colonoscopy explained. She needs to do that to rule out colon polyps or cancer. Screening for hyperlipidemia she has not done that yet. So she will do that in 3 months after watching diet and do fasting blood work before return to the office. Review of Systems   Constitutional: Negative for appetite change, chills, fever and unexpected weight change. HENT: Negative for congestion, ear discharge, ear pain, nosebleeds, rhinorrhea, sinus pressure, sinus pain, sore throat and trouble swallowing. Eyes: Negative for pain and discharge. Respiratory: Negative for cough, chest tightness, shortness of breath and wheezing. Cardiovascular: Negative for chest pain, palpitations and leg swelling. Gastrointestinal: Negative for abdominal pain, blood in stool, nausea and vomiting. Endocrine: Negative for polydipsia and polyphagia.    Genitourinary: Negative for difficulty urinating, enuresis, flank pain and hematuria. Musculoskeletal: Negative for myalgias. Left ankle stiff. Skin: Negative for rash. Neurological: Negative for facial asymmetry, weakness, light-headedness, numbness and headaches. Psychiatric/Behavioral: Negative for confusion. No current outpatient medications on file prior to visit. No current facility-administered medications on file prior to visit. No Known Allergies  History reviewed. No pertinent past medical history. Past Surgical History:   Procedure Laterality Date    ANKLE SURGERY Left 10/24/2019    LEFT ANKLE INCISION AND DRAINAGE OF OSTEOMYELITIS AND ARTHROTOMY OF LEFT ANKLE performed by Gabriela Watson MD at [place] [address]        Social History     Tobacco Use    Smoking status: Never Smoker    Smokeless tobacco: Never Used   Substance Use Topics    Alcohol use: No      Family History   Problem Relation Age of Onset    High Blood Pressure Mother     Diabetes Sister     Diabetes Brother         Vitals:    02/03/20 1001   BP: 130/62   Site: Left Upper Arm   Position: Sitting   Cuff Size: Medium Adult   Pulse: 89   Resp: 16   Temp: 98.3 °F (36.8 °C)   SpO2: 99%   Weight: 187 lb (84.8 kg)   Height: 4' 9.8\" (1.468 m)     Estimated body mass index is 39.35 kg/m² as calculated from the following:    Height as of this encounter: 4' 9.8\" (1.468 m). Weight as of this encounter: 187 lb (84.8 kg). Physical Exam  Vitals signs and nursing note reviewed. Constitutional:       General: She is not in acute distress. Appearance: She is well-developed. HENT:      Head: Normocephalic and atraumatic. Right Ear: External ear normal.      Left Ear: External ear normal.   Eyes:      General: Lids are normal. No scleral icterus. Right eye: No discharge. Left eye: No discharge. Conjunctiva/sclera: Conjunctivae normal.      Pupils: Pupils are equal, round, and reactive to light.    Neck: Musculoskeletal: Neck supple. Thyroid: No thyroid mass or thyromegaly. Trachea: No tracheal deviation. Cardiovascular:      Rate and Rhythm: Normal rate and regular rhythm. Heart sounds: Normal heart sounds. No gallop. Pulmonary:      Effort: Pulmonary effort is normal.      Breath sounds: Normal breath sounds. No wheezing or rales. Abdominal:      General: Bowel sounds are normal.      Palpations: Abdomen is soft. There is no mass. Tenderness: There is no abdominal tenderness. Musculoskeletal:         General: No tenderness. Comments: Left ankle stiff. Wound all healed and ankle does not feel warm or red or tender. No leg edema or calf tenderness   Lymphadenopathy:      Cervical: No cervical adenopathy. Skin:     General: Skin is warm and dry. Findings: No rash. Neurological:      Mental Status: She is alert and oriented to person, place, and time. Cranial Nerves: No cranial nerve deficit. Sensory: No sensory deficit. Coordination: Coordination normal.   Psychiatric:         Speech: Speech normal.         Behavior: Behavior normal.         Thought Content: Thought content normal.         ASSESSMENT/PLAN:  1. Staphylococcal arthritis of left ankle (HCC)  Resolved    2. Class 1 obesity due to excess calories without serious comorbidity with body mass index (BMI) of 32.0 to 32.9 in adult  Keep working on diet    3. Screening for colon cancer  Get colonoscopy check  - [facility] - Terry Cohen MD, Gastroenterology, [facility]    4. Screening for diabetes mellitus    - GLUCOSE; Future    5. Screening for hyperlipidemia    - LIPID PANEL; Future      Return in about 3 months (around 5/5/2020) for Labs. Patient Instructions   Keep follow-up with orthopedics. Keep trying to lose weight with lean low-carb diet what she has done before and lost weight. Fasting blood work in 3 months and follow-up after that.     Screening colonoscopy in couple of months with gastroenterologist.    Can use pedlar to do exercises. Elliptical machine or sitting health rider's to avoid strain on the ankle. Get flu shot and shingles and tetanus Tdap shot from the pharmacy. Electronically signed by Fabiola Izaguirre MD on 2/3/2020 at 10:52 AM     This dictation was generated by voice recognition computer software. Although all attempts are made to edit the dictation for accuracy, there may be errors in the transcription that are not intended.

## 2020-02-13 ENCOUNTER — OFFICE VISIT (OUTPATIENT)
Dept: ORTHOPEDIC SURGERY | Age: 55
End: 2020-02-13

## 2020-02-13 VITALS — HEIGHT: 57 IN | RESPIRATION RATE: 16 BRPM | BODY MASS INDEX: 40.34 KG/M2 | WEIGHT: 187 LBS

## 2020-02-13 PROCEDURE — 99213 OFFICE O/P EST LOW 20 MIN: CPT | Performed by: ORTHOPAEDIC SURGERY

## 2020-02-18 NOTE — PROGRESS NOTES
DIAGNOSIS:  Left ankle septic arthritis abscess, status post arthrotomy with incision and drainage. DATE OF SURGERY: 10/24/2019. HISTORY OF PRESENT ILLNESS:  Ms. Gerri Guerrero 48 y.o.  female who came in today for 3 month postoperative visit. The patient denies any significant pain in the left ankle. She states her pain is much improved since his surgery. She has been WB and doing better. No numbness or tingling sensation. No fever or Chills. She grew up MSSA and completed IV antibiotics through a PICC managed by Dr Apurva Stock, infectious disease and was switched to oral antibiotics, Keflex which she has completed. She did not do physical therapy. No past medical history on file.     Past Surgical History:   Procedure Laterality Date    ANKLE SURGERY Left 10/24/2019    LEFT ANKLE INCISION AND DRAINAGE OF OSTEOMYELITIS AND ARTHROTOMY OF LEFT ANKLE performed by Geraldine Barrientos MD at [address] History     Socioeconomic History    Marital status:      Spouse name: Not on file    Number of children: Not on file    Years of education: Not on file    Highest education level: Not on file   Occupational History    Not on file   Social Needs    Financial resource strain: Not on file    Food insecurity:     Worry: Not on file     Inability: Not on file    Transportation needs:     Medical: Not on file     Non-medical: Not on file   Tobacco Use    Smoking status: Never Smoker    Smokeless tobacco: Never Used   Substance and Sexual Activity    Alcohol use: No    Drug use: No    Sexual activity: Not on file   Lifestyle    Physical activity:     Days per week: Not on file     Minutes per session: Not on file    Stress: Not on file   Relationships    Social connections:     Talks on phone: Not on file     Gets together: Not on file     Attends Worship service: Not on file     Active member of club or organization: Not on file     Attends meetings of clubs or patient that I think that she would really benefit from a course of physical therapy for further strengthening and stretching. An Rx for physical therapy was given to the patient. The patient will come back for a follow up in 2 months with new xray left ankle. The patient understands that there is a chance of abscess recurrence even after surgical I&D.       Regina Walsh MD

## 2020-02-28 ENCOUNTER — HOSPITAL ENCOUNTER (OUTPATIENT)
Dept: PHYSICAL THERAPY | Age: 55
Setting detail: THERAPIES SERIES
Discharge: HOME | End: 2020-02-28

## 2020-02-28 PROCEDURE — 97110 THERAPEUTIC EXERCISES: CPT

## 2020-02-28 PROCEDURE — 97530 THERAPEUTIC ACTIVITIES: CPT

## 2020-02-28 PROCEDURE — 97140 MANUAL THERAPY 1/> REGIONS: CPT

## 2020-02-28 PROCEDURE — 97161 PT EVAL LOW COMPLEX 20 MIN: CPT

## 2020-02-28 NOTE — FLOWSHEET NOTE
2 min    Posterior talur glides Grade 2-3  2 min    Anterior talur glide with DF Grade 2-3  2 min    DF stretch with posterior talur glide  20\" 4                      Pt. Education:  2/28/20 Pt was educated on PT POC, Diagnosis, Prognosis, pathomechanics as well as frequency and duration of scheduling future physical therapy appointments. Time was also taken on this day to answer all patient questions and participation in PT. Home Exercise Program:  2/28/20 Patient was educated on home exercise program including distrubution of handout describing exercises, sets, repetitions, frequency and intensity. Exercises/activities include: towel gastroc stretch, DF and PF with blue band, seated DF/PF      Therapeutic Exercise and NMR EXR  [x] (03162) Provided verbal/tactile cueing for activities related to strengthening, flexibility, endurance, ROM for improvements in  [x] LE / Lumbar: LE, proximal hip, and core control with self care, mobility, lifting, ambulation. [] UE / Cervical: cervical, postural, scapular, scapulothoracic and UE control with self care, reaching, carrying, lifting, house/yardwork, driving, computer work.  [] (03536) Provided verbal/tactile cueing for activities related to improving balance, coordination, kinesthetic sense, posture, motor skill, proprioception to assist with   [] LE / lumbar: LE, proximal hip, and core control in self care, mobility, lifting, ambulation and eccentric single leg control.    [] UE / cervical: cervical, scapular, scapulothoracic and UE control with self care, reaching, carrying, lifting, house/yardwork, driving, computer work.   [] (24320) Therapist is in constant attendance of 2 or more patients providing skilled therapy interventions, but not providing any significant amount of measurable one-on-one time to either patient, for improvements in  [] LE / lumbar: LE, proximal hip, and core control in self care, mobility, lifting, ambulation and eccentric single leg patient tolerance, in order to prevent re-injury. [] Progressing: [] Met: [] Not Met: [] Adjusted  2. Patient will have a decrease in pain to facilitate improvement in movement, function, and ADLs as indicated by Functional Deficits. [] Progressing: [] Met: [] Not Met: [] Adjusted    Long Term Goals: To be achieved in: 4 weeks  1. Disability index score of 30% or less for the LEFS to assist with reaching prior level of function. [] Progressing: [] Met: [] Not Met: [] Adjusted  2. Patient will demonstrate increased AROM to 15 degrees of DF to allow for proper joint functioning as indicated by patients Functional Deficits. [] Progressing: [] Met: [] Not Met: [] Adjusted  3. Patient will demonstrate an increase in Strength to at least 4 as well as good proximal hip strength and control to allow for proper functional mobility as indicated by patients Functional Deficits. [] Progressing: [] Met: [] Not Met: [] Adjusted  4. Patient will return to functional activities including ability to navigate stairs with reciprocal pattern and 1 UE A without increased symptoms or restriction. [] Progressing: [] Met: [] Not Met: [] Adjusted  5. Patient will be able to ambulate community distances with NO AD and no reports of pain or issues. [] Progressing: [] Met: [] Not Met: [] Adjusted     Overall Progression Towards Functional goals/ Treatment Progress Update:  [] Patient is progressing as expected towards functional goals listed. [] Progression is slowed due to complexities/Impairments listed. [] Progression has been slowed due to co-morbidities.   [x] Plan just implemented, too soon to assess goals progression <30days   [] Goals require adjustment due to lack of progress  [] Patient is not progressing as expected and requires additional follow up with physician  [] Other    Persisting Functional

## 2020-02-28 NOTE — PLAN OF CARE
Erika, [facility], [address]  Phone: [phone]   Fax: [phone]                                                       Physical Therapy Certification    Dear Referring Practitioner: Shante Romero MD,    We had the pleasure of evaluating the following patient for physical therapy services at [address] [place] [address]. A summary of our findings can be found in the initial assessment below. This includes our plan of care. If you have any questions or concerns regarding these findings, please do not hesitate to contact me at the office phone number checked above. Thank you for the referral.       Physician Signature:_______________________________Date:__________________  By signing above (or electronic signature), therapists plan is approved by physician      Patient: Herlinda Vargas   : 1965   MRN: [id number]  Referring Physician: Referring Practitioner: Shante Romero MD      Evaluation Date: 2020      Medical Diagnosis Information:  Diagnosis: ankle synovitis; Septic arthritis of L ankle   Treatment Diagnosis: significant decrease in L ankle joint mobility, lack of ROM, decreased fucntional strength and balance all contributing to limitaitons with ambulation, stair navigation and community mobility. Insurance information: PT Insurance Information: Self Pay     Precautions/ Contra-indications: n/a  Latex Allergy:  [x]NO      []YES  Preferred Language for Healthcare:   [x]English       []other:    SUBJECTIVE: Patient stated complaint: Pt reports her L ankle pain has been a chronic thing since July of last year with insidious onset of L ankle pain and progressively got worse for a long while.  Pt reports after several visits with MD and various treatment techniques, nothing really helped and after MRI she (L5)          Flexibility     Hamstrings (90/90)     ITB (Gabby)     Quads (Ely's)     Hip Flexor Galindo Host)     Gastroc 8 15       Joint mobility: L talocrual joint    []Normal    [x]Hypo   []Hyper    Orthopaedic Special Tests - Positive  Negative  NT Comments    Hip       STONEY / Moe's       FADIR       Scour       Trendelenburg              Knee       Lachman's / Anterior Drawer       Posterior Drawer       Varus Stress       Valgus Stress       Jameson's        Appley's       Thessaly's       Patellar Tracking              Ankle       Anterior Drawer  x     Talar Tilt  x     Vyas  x     Brock's   x                Balance: SLS on R: 7 seconds, L: unable to hold longer than 2-3 seconds                         [x] Patient history, allergies, meds reviewed. Medical chart reviewed. See intake form. Review Of Systems (ROS):  [x]Performed Review of systems (Integumentary, CardioPulmonary, Neurological) by intake and observation. Intake form has been scanned into medical record. Patient has been instructed to contact their primary care physician regarding ROS issues if not already being addressed at this time.       Co-morbidities/Complexities (which will affect course of rehabilitation): -  [x]None           Arthritic conditions   []Rheumatoid arthritis (M05.9)  []Osteoarthritis (M19.91)   Cardiovascular conditions   []Hypertension (I10)  []Hyperlipidemia (E78.5)  []Angina pectoris (I20)  []Atherosclerosis (I70)   Musculoskeletal conditions   []Disc pathology   []Congenital spine pathologies   []Prior surgical intervention  []Osteoporosis (M81.8)  []Osteopenia (M85.8)   Endocrine conditions   []Hypothyroid (E03.9)  []Hyperthyroid Gastrointestinal conditions   []Constipation (D67.70)   Metabolic conditions   []Morbid obesity (E66.01)  []Diabetes type 1(E10.65) or 2 (E11.65)   []Neuropathy (G60.9)     Pulmonary conditions   []Asthma (J45)  []Coughing   []COPD (J44.9)   Psychological Disorders  []Anxiety [x] A clinical presentation with:  [x] stable and/or uncomplicated characteristics   [] evolving clinical presentation with changing characteristics  [] unstable and unpredictable characteristics;   [x] Clinical decision making of [x] Low, [] moderate, [] high complexity using standardized patient assessment instrument and/or measurable assessment of functional outcome. [x] EVAL (LOW) 40764 (typically 15 minutes face-to-face)  [] EVAL (MOD) 92862 (typically 30 minutes face-to-face)  [] EVAL (HIGH) 91813 (typically 45 minutes face-to-face)  [] RE-EVAL     PLAN:   Frequency/Duration:  1-2 days per week for 4 Weeks:  Interventions:  [x]  Therapeutic exercise including: strength training, ROM, for Lower extremity and core   [x]  NMR activation and proprioception for LE, Glutes and Core   [x]  Manual therapy as indicated for LE, Hip and spine to include: Dry Needling/IASTM, STM, PROM, Gr I-IV mobilizations, manipulation. [x] Modalities as needed that may include: thermal agents, E-stim, Biofeedback, US, iontophoresis as indicated  [x] Patient education on joint protection, postural re-education, activity modification, progression of HEP. HEP instruction: Written HEP instructions provided and reviewed. GOALS:  Patient stated goal: decrease pain with standing and cooking as well as with community mobility   [] Progressing: [] Met: [] Not Met: [] Adjusted    Therapist goals for Patient:   Short Term Goals: To be achieved in: 2 weeks  1. Independent in HEP and progression per patient tolerance, in order to prevent re-injury. [] Progressing: [] Met: [] Not Met: [] Adjusted  2. Patient will have a decrease in pain to facilitate improvement in movement, function, and ADLs as indicated by Functional Deficits. [] Progressing: [] Met: [] Not Met: [] Adjusted    Long Term Goals: To be achieved in: 4 weeks  1. Disability index score of 30% or less for the LEFS to assist with reaching prior level of function.    []

## 2020-03-04 PROBLEM — Z13.220 SCREENING FOR HYPERLIPIDEMIA: Status: RESOLVED | Noted: 2020-02-03 | Resolved: 2020-03-04

## 2020-03-04 PROBLEM — Z12.11 SCREENING FOR COLON CANCER: Status: RESOLVED | Noted: 2020-02-03 | Resolved: 2020-03-04

## 2020-03-04 PROBLEM — Z13.1 SCREENING FOR DIABETES MELLITUS: Status: RESOLVED | Noted: 2020-02-03 | Resolved: 2020-03-04

## 2020-03-12 ENCOUNTER — HOSPITAL ENCOUNTER (OUTPATIENT)
Dept: PHYSICAL THERAPY | Age: 55
Setting detail: THERAPIES SERIES
Discharge: HOME | End: 2020-03-12

## 2020-03-12 PROCEDURE — 97110 THERAPEUTIC EXERCISES: CPT

## 2020-03-12 PROCEDURE — 97140 MANUAL THERAPY 1/> REGIONS: CPT

## 2020-03-12 PROCEDURE — 97530 THERAPEUTIC ACTIVITIES: CPT

## 2020-03-12 NOTE — FLOWSHEET NOTE
[address] [facility] Physical Therapy  Phone: [phone]   Fax: [phone]    Physical Therapy Daily Treatment Note  Date:  3/12/2020    Patient Name:  Jayashree Albrecht    :  1965  MRN: [id number]  Medical/Treatment Diagnosis Information:  Diagnosis: ankle synovitis; Septic arthritis of L ankle  Treatment Diagnosis: significant decrease in L ankle joint mobility, lack of ROM, decreased fucntional strength and balance all contributing to limitaitons with ambulation, stair navigation and community mobility. Insurance/Certification information:  PT Insurance Information: Self Pay  Physician Information:  Referring Practitioner: Zaina Valiente MD  Plan of care signed (Y/N): []  Yes [x]  No     Date of Patient follow up with Physician:      Progress Report: []  Yes  [x]  No     Date Range for reporting period:  Beginnin/28  Ending: -    Progress report due (10 Rx/or 30 days whichever is less): visit #86 or      Recertification due (POC duration/ or 90 days whichever is less): 3/27     Visit # Insurance Allowable Auth required? Date Range   2 Self pay []  Yes  [x]  No -     Latex Allergy:  [x]NO      []YES  Preferred Language for Healthcare:   []English       [x]other: Polish (needs )     Functional Scale:        Date assessed:  LEFS: raw score = 47/80; dysfunction = 41.25%      Pain level:  1-2/10     SUBJECTIVE:  Pt reports since last visit she is doing well, with increased walking and with HEP she does have some increase in her pain levels that usually are moderate for a few hours after but not limiting with anything at home. Pt does report that she has been walking at home with no AD and feels it is going ok, some increase in pain but not bad. HEP going well no issues, sore after due to activity in general.     OBJECTIVE:   3/12: Noted stitch still present medial ankle incision, no redness or bleeding.  Slight movement of stitch detatched from skin, as well as good proximal hip strength and control to allow for proper functional mobility as indicated by patients Functional Deficits. [] Progressing: [] Met: [] Not Met: [] Adjusted  4. Patient will return to functional activities including ability to navigate stairs with reciprocal pattern and 1 UE A without increased symptoms or restriction. [] Progressing: [] Met: [] Not Met: [] Adjusted  5. Patient will be able to ambulate community distances with NO AD and no reports of pain or issues. [] Progressing: [] Met: [] Not Met: [] Adjusted     Overall Progression Towards Functional goals/ Treatment Progress Update:  [] Patient is progressing as expected towards functional goals listed. [] Progression is slowed due to complexities/Impairments listed. [] Progression has been slowed due to co-morbidities. [x] Plan just implemented, too soon to assess goals progression <30days   [] Goals require adjustment due to lack of progress  [] Patient is not progressing as expected and requires additional follow up with physician  [] Other    Persisting Functional Limitations/Impairments:  []Sleeping []Sitting               []Standing [x]Transfers        [x]Walking [x]Kneeling               [x]Stairs [x]Squatting / bending   [x]ADLs []Reaching  [x]Lifting  [x]Housework  []Driving []Job related tasks  []Sports/Recreation []Other:        ASSESSMENT:  Small stitch present as noted above, with slight movement of stitch it was noted to be still attached however with movement stitch detached, still raised area of scar tissue noted mid scar. Negative for s/s of infection, no bleeding or openings noted either. Pt tolerating all interventions well, inconsistent mild pain noted with WB activity and with ankle inversion on wobble board however no limiting pain. Mobility slowly improving as well as strength and activity tolerance.      Treatment/Activity Tolerance:  [x] Patient able to complete tx  [] Patient limited by

## 2020-03-20 ENCOUNTER — HOSPITAL ENCOUNTER (OUTPATIENT)
Dept: PHYSICAL THERAPY | Age: 55
Setting detail: THERAPIES SERIES
Discharge: HOME | End: 2020-03-20

## 2020-03-20 PROCEDURE — 97110 THERAPEUTIC EXERCISES: CPT

## 2020-03-20 PROCEDURE — 97140 MANUAL THERAPY 1/> REGIONS: CPT

## 2020-03-20 NOTE — FLOWSHEET NOTE
[address] [facility] Physical Therapy  Phone: [phone]   Fax: [phone]    Physical Therapy Daily Treatment Note  Date:  3/20/2020    Patient Name:  Sarahi Garcia    :  1965  MRN: [id number]  Medical/Treatment Diagnosis Information:  Diagnosis: ankle synovitis; Septic arthritis of L ankle  Treatment Diagnosis: significant decrease in L ankle joint mobility, lack of ROM, decreased fucntional strength and balance all contributing to limitaitons with ambulation, stair navigation and community mobility. Insurance/Certification information:  PT Insurance Information: Self Pay  Physician Information:  Referring Practitioner: Jenae Valenzuela MD  Plan of care signed (Y/N): []  Yes [x]  No     Date of Patient follow up with Physician:      Progress Report: []  Yes  [x]  No     Date Range for reporting period:  Beginnin/28  Ending: -    Progress report due (10 Rx/or 30 days whichever is less): visit #17 or      Recertification due (POC duration/ or 90 days whichever is less): 3/27     Visit # Insurance Allowable Auth required? Date Range   3 Self pay []  Yes  [x]  No -     Latex Allergy:  [x]NO      []YES  Preferred Language for Healthcare:   []English       [x]other: French (needs )     Functional Scale:        Date assessed:  LEFS: raw score = 47/80; dysfunction = 41.25%      Pain level:  1-2/10     SUBJECTIVE:   Patient reports that after being on her ankle for a period of time her pain goes up to a 3/10 in left ankle. Pt reports since last visit she is doing well, with increased walking and with HEP she does have some increase in her pain levels that usually are moderate for a few hours after but not limiting with anything at home. Pt does report that she has been walking at home with no AD and feels it is going ok, some increase in pain but not bad.  HEP going well no issues, sore after due to activity in general.     OBJECTIVE:   3/12: Noted stitch still present medial ankle incision, no redness or bleeding. Slight movement of stitch detatched from skin, still noted slight raised area mid scar, again no signs of infection and good healing around area, minimal tenderness. RESTRICTIONS/PRECAUTIONS: n/a    Exercises/Interventions:     Therapeutic Exercises (78465) Resistance / level Sets/sec Reps Notes   Ankle DF with band Ankle PF with band Seated ankle DF/PF Towel gastroc stretch Slant board  20\" 3 Added 3/12   Standing heel raises  2 10 Added 3/12   Standing toe raises  2 10 Added 3/12   Seated wobble board (a/p, Side to side/ CW/CCW) L1 2 10 each direction Added 3/12                                      Therapeutic Activities (41896)       Squats to chair  2 10 Added 3/12   Lunge weight shift L/R LE on 6 \" step   10 Added 3/20                        Neuromuscular Re-ed (42113)                                                 Manual Intervention (28446)       Talur distraction    2 min    Posterior talur glides Grade 2-3  2 min    Anterior talur glide with DF Grade 2-3  2 min    DF stretch with posterior talur glide  20\" 4                      Pt. Education:  2/28/20 Pt was educated on PT POC, Diagnosis, Prognosis, pathomechanics as well as frequency and duration of scheduling future physical therapy appointments. Time was also taken on this day to answer all patient questions and participation in PT. Home Exercise Program:  Access Code: [email or web address]   URL: [email or web address]   Date: 03/20/2020   Prepared by: Pearl Deluca     Exercises   Seated Ankle Circles - 10 reps - 3 sets - 1x daily - 7x weekly   Seated Ankle Alphabet - 10 reps - 3 sets - 1x daily - 7x weekly     Access Code: [email or web address]   URL: [email or web address]   Date: 03/12/2020   Prepared by: Leandro Connors     Exercises   Standing Heel Raise - 10 reps - 3 sets - 1x daily - 7x weekly   Standing Ankle Dorsiflexion with Table Support - 10 reps - 3 sets - 1x daily - 7x weekly   Mini Squat with Chair - 10 reps - 2 sets - 1x daily - 7x weekly   Gastroc Stretch on Wall - 3 reps - 20 hold - 1x daily - 7x weekly     2/28/20 Patient was educated on home exercise program including distrubution of handout describing exercises, sets, repetitions, frequency and intensity. Exercises/activities include: towel gastroc stretch, DF and PF with blue band, seated DF/PF      Therapeutic Exercise and NMR EXR  [x] (08010) Provided verbal/tactile cueing for activities related to strengthening, flexibility, endurance, ROM for improvements in  [x] LE / Lumbar: LE, proximal hip, and core control with self care, mobility, lifting, ambulation. [] UE / Cervical: cervical, postural, scapular, scapulothoracic and UE control with self care, reaching, carrying, lifting, house/yardwork, driving, computer work.  [] (73899) Provided verbal/tactile cueing for activities related to improving balance, coordination, kinesthetic sense, posture, motor skill, proprioception to assist with   [] LE / lumbar: LE, proximal hip, and core control in self care, mobility, lifting, ambulation and eccentric single leg control. [] UE / cervical: cervical, scapular, scapulothoracic and UE control with self care, reaching, carrying, lifting, house/yardwork, driving, computer work.   [] (44570) Therapist is in constant attendance of 2 or more patients providing skilled therapy interventions, but not providing any significant amount of measurable one-on-one time to either patient, for improvements in  [] LE / lumbar: LE, proximal hip, and core control in self care, mobility, lifting, ambulation and eccentric single leg control. [] UE / cervical: cervical, scapular, scapulothoracic and UE control with self care, reaching, carrying, lifting, house/yardwork, driving, computer work.      NMR and Therapeutic Activities:    [x] (03568 or 47015) Provided verbal/tactile cueing for activities related to improving balance, coordination, kinesthetic sense, posture, motor skill, proprioception and motor activation to allow for proper function of   [x] LE: / Lumbar core, proximal hip and LE with self care and ADLs  [] UE / Cervical: cervical, postural, scapular, scapulothoracic and UE control with self care, carrying, lifting, driving, computer work.   [] (50134) Gait Re-education- Provided training and instruction to the patient for proper LE, core and proximal hip recruitment and positioning and eccentric body weight control with ambulation re-education including up and down stairs     Home Management Training / Self Care:  [] (06968) Provided self-care/home management training related to activities of daily living and compensatory training, and/or use of adaptive equipment for improvement with: ADLs and compensatory training, meal preparation, safety procedures and instruction in use of adaptive equipment, including bathing, grooming, dressing, personal hygiene, basic household cleaning and chores.      Home Exercise Program:    [x] (91314) Reviewed/Progressed HEP activities related to strengthening, flexibility, endurance, ROM of   [x] LE / Lumbar: core, proximal hip and LE for functional self-care, mobility, lifting and ambulation/stair navigation   [] UE / Cervical: cervical, postural, scapular, scapulothoracic and UE control with self care, reaching, carrying, lifting, house/yardwork, driving, computer work  [] (78249)Reviewed/Progressed HEP activities related to improving balance, coordination, kinesthetic sense, posture, motor skill, proprioception of   [] LE: core, proximal hip and LE for self care, mobility, lifting, and ambulation/stair navigation    [] UE / Cervical: cervical, postural,  scapular, scapulothoracic and UE control with self care, reaching, carrying, lifting, house/yardwork, driving, computer work    Manual Treatments:  PROM / STM / Oscillations-Mobs:  G-I, II, III, IV (PA's, Inf., Post.)  [x] (46128) Provided manual therapy to mobilize LE, proximal hip and/or LS spine soft tissue/joints for the purpose of modulating pain, promoting relaxation,  increasing ROM, reducing/eliminating soft tissue swelling/inflammation/restriction, improving soft tissue extensibility and allowing for proper ROM for normal function with   [x] LE / lumbar: self care, mobility, lifting and ambulation. [] UE / Cervical: self care, reaching, carrying, lifting, house/yardwork, driving, computer work. Modalities:  [] (98817) Vasopneumatic compression: Utilized vasopneumatic compression to decrease edema / swelling for the purpose of improving mobility and quad tone / recruitment which will allow for increased overall function including but not limited to self-care, transfers, ambulation, and ascending / descending stairs. Modalities:      Charges:  Timed Code Treatment Minutes: 50   Total Treatment Minutes: 50     [] EVAL - LOW (11448)   [] EVAL - MOD (97651)  [] EVAL - HIGH (75935)  [] RE-EVAL (65780)  [x] XW(56958) x 2      [] Ionto  [] NMR (05703) x       [] Vaso  [x] Manual (02050) x 1      [] Ultrasound  [] TA x        [] Mech Traction (91619)  [] Aquatic Therapy x     [] ES (un) (37419):   [] Home Management Training x  [] ES(attended) (61052)   [] Dry Needling 1-2 muscles (01267):  [] Dry Needling 3+ muscles (432988)  [] Group:      [] Other:     GOALS:  Patient stated goal: decrease pain with standing and cooking as well as with community mobility   [] Progressing: [] Met: [] Not Met: [] Adjusted    Therapist goals for Patient:   Short Term Goals: To be achieved in: 2 weeks  1. Independent in HEP and progression per patient tolerance, in order to prevent re-injury. [] Progressing: [] Met: [] Not Met: [] Adjusted  2. Patient will have a decrease in pain to facilitate improvement in movement, function, and ADLs as indicated by Functional Deficits. [] Progressing: [] Met: [] Not Met: [] Adjusted    Long Term Goals:  To be achieved in: 4 weeks  1. Disability index score of 30% or less for the LEFS to assist with reaching prior level of function. [] Progressing: [] Met: [] Not Met: [] Adjusted  2. Patient will demonstrate increased AROM to 15 degrees of DF to allow for proper joint functioning as indicated by patients Functional Deficits. [] Progressing: [] Met: [] Not Met: [] Adjusted  3. Patient will demonstrate an increase in Strength to at least 4 as well as good proximal hip strength and control to allow for proper functional mobility as indicated by patients Functional Deficits. [] Progressing: [] Met: [] Not Met: [] Adjusted  4. Patient will return to functional activities including ability to navigate stairs with reciprocal pattern and 1 UE A without increased symptoms or restriction. [] Progressing: [] Met: [] Not Met: [] Adjusted  5. Patient will be able to ambulate community distances with NO AD and no reports of pain or issues. [] Progressing: [] Met: [] Not Met: [] Adjusted     Overall Progression Towards Functional goals/ Treatment Progress Update:  [] Patient is progressing as expected towards functional goals listed. [] Progression is slowed due to complexities/Impairments listed. [] Progression has been slowed due to co-morbidities. [x] Plan just implemented, too soon to assess goals progression <30days   [] Goals require adjustment due to lack of progress  [] Patient is not progressing as expected and requires additional follow up with physician  [] Other    Persisting Functional Limitations/Impairments:  []Sleeping []Sitting               []Standing [x]Transfers        [x]Walking [x]Kneeling               [x]Stairs [x]Squatting / bending   [x]ADLs []Reaching  [x]Lifting  [x]Housework  []Driving []Job related tasks  []Sports/Recreation []Other:        ASSESSMENT:  Patient had noticeable release of scaring in left ankle during manual therapy.  Patient reviewed HEP and discussed continuing Therapy at a later time due to Covid-19. Small stitch present as noted above, with slight movement of stitch it was noted to be still attached however with movement stitch detached, still raised area of scar tissue noted mid scar. Negative for s/s of infection, no bleeding or openings noted either. Pt tolerating all interventions well, inconsistent mild pain noted with WB activity and with ankle inversion on wobble board however no limiting pain. Mobility slowly improving as well as strength and activity tolerance. Treatment/Activity Tolerance:  [x] Patient able to complete tx  [] Patient limited by fatigue  [] Patient limited by pain  [] Patient limited by other medical complications  [] Other:     Prognosis: [x] Good [] Fair  [] Poor    Patient Requires Follow-up: [x] Yes  [] No    Plan for next treatment session: emphasis with manual techniques on Ankle mobility in general, f/u with strength progressions as able. PLAN: See eval. PT 1-2x / week for 4 weeks. [x] Continue per plan of care [] Alter current plan (see comments)  [] Plan of care initiated [] Hold pending MD visit [] Discharge    Electronically signed by: Nel Mast PT, DPT    Note: If patient does not return for scheduled/ recommended follow up visits, this note will serve as a discharge from care along with most recent update on progress.

## 2020-03-27 ENCOUNTER — APPOINTMENT (OUTPATIENT)
Dept: PHYSICAL THERAPY | Age: 55
End: 2020-03-27

## 2020-09-01 ENCOUNTER — OFFICE VISIT (OUTPATIENT)
Dept: ORTHOPEDIC SURGERY | Age: 55
End: 2020-09-01

## 2020-09-01 VITALS — BODY MASS INDEX: 40.34 KG/M2 | WEIGHT: 187 LBS | HEIGHT: 57 IN | TEMPERATURE: 98.2 F

## 2020-09-01 PROCEDURE — 99213 OFFICE O/P EST LOW 20 MIN: CPT | Performed by: ORTHOPAEDIC SURGERY

## 2020-09-01 NOTE — PROGRESS NOTES
DIAGNOSIS:  Left ankle septic arthritis abscess, status post arthrotomy with incision and drainage. DATE OF SURGERY: 10/24/2019. HISTORY OF PRESENT ILLNESS:  Ms. Jocelynn Stevenson 50 y.o.  female who came in today for follow up visit. The patient denies any significant pain in the left ankle. She states her pain is much improved since his surgery. She rates her pain a 5/10 VAS and says that her ankle has been swelling off and on and she is concerned about that. She has been WB. She was able to start therapy but it was canceled due to COVID-19. She states that she does not have insurance and she does not work making the financial part difficult. No numbness or tingling sensation. No fever or Chills. She grew up [place] and completed IV antibiotics through a PICC managed by Dr Shannon Forrest, infectious disease and was switched to oral antibiotics, Keflex which she has completed. She has been released from infectious disease and is doing well from that standpoint. History reviewed. No pertinent past medical history.     Past Surgical History:   Procedure Laterality Date    ANKLE SURGERY Left 10/24/2019    LEFT ANKLE INCISION AND DRAINAGE OF OSTEOMYELITIS AND ARTHROTOMY OF LEFT ANKLE performed by Ariel Hernandez MD at [address] History     Socioeconomic History    Marital status:      Spouse name: Not on file    Number of children: Not on file    Years of education: Not on file    Highest education level: Not on file   Occupational History    Not on file   Social Needs    Financial resource strain: Not on file    Food insecurity     Worry: Not on file     Inability: Not on file    Transportation needs     Medical: Not on file     Non-medical: Not on file   Tobacco Use    Smoking status: Never Smoker    Smokeless tobacco: Never Used   Substance and Sexual Activity    Alcohol use: No    Drug use: No    Sexual activity: Not on file   Lifestyle    Physical showed no fracture. No other abnormality. Surgical pathology consistent with inflamed synovial tissue. IMPRESSION:  6 months out from left ankle septic arthritis, abscess incision and drainage and arthrotomy    PLAN: She can WBAT and return to normal activities with no restrictions. She was instructed to work on range of motion and strengthening exercises. I discussed with the patient that I think that she would really benefit from a course of physical therapy for further strengthening and stretching. An Rx for physical therapy was given to the patient. Will refer for to financial services. The patient will come back for a follow up PRN. The patient understands that there is a chance of abscess recurrence even after surgical I&D.       Summer Mooney MD

## 2020-09-04 ENCOUNTER — HOSPITAL ENCOUNTER (OUTPATIENT)
Dept: PHYSICAL THERAPY | Age: 55
Setting detail: THERAPIES SERIES
Discharge: HOME | End: 2020-09-04

## 2020-09-04 PROCEDURE — 97161 PT EVAL LOW COMPLEX 20 MIN: CPT

## 2020-09-04 PROCEDURE — 97530 THERAPEUTIC ACTIVITIES: CPT

## 2020-09-04 NOTE — FLOWSHEET NOTE
[address] [facility]  Phone: [phone]   Fax: [phone]    Physical Therapy Treatment Note/ Progress Report:     Date:  2020    Patient Name:  Nicolette Cisneros    :  1965  MRN: [id number]  Restrictions/Precautions:    Medical/Treatment Diagnosis Information:  Diagnosis: M00.9 (ICD-10-CM) - Septic arthritis of left ankle, due to unspecified organism [facility])  Treatment Diagnosis: L ankle hypomobility at talocrural, subtalar, and digits; decreased L ankle ROM, decreased L ankle strength, abnormal gait, decreased balance       Insurance/Certification information:  PT Insurance Information: Self Pay  Physician Information:  Referring Practitioner: Guido Ortiz MD  Plan of care signed (Y/N): []  Yes [x]  No     Date of Patient follow up with Physician: ALEJANDRA     Progress Report: [x]  Yes  []  No     Date Range for reporting period:  Beginnin2020  Ending:     Progress report due (10 Rx/or 30 days whichever is less): visit #10 or 15/8/8904     Recertification due (POC duration/ or 90 days whichever is less): visit #12 or 10/16/2020 (6 weeks)     Visit # Insurance Allowable Auth required?  Date Range   1 Self Pay []  Yes  [x]  No -     Latex Allergy:  [x]NO      []YES  Preferred Language for Healthcare:   [x]English       []other:    Functional Scale:        Date assessed:  LEFS: raw score = 52; dysfunction = 35%   2020    Pain level:  3-5/10     SUBJECTIVE:  See eval    OBJECTIVE: See eval      RESTRICTIONS/PRECAUTIONS: previous L ankle clean out for infection     Exercises/Interventions:     Therapeutic Exercise (40695)  Resistance / level Sets/sec Reps Notes / Cues   IB - Gastroc Stretch       IB - Soleus Stretch       Ankle Stretches        Ankle Circles               Foot Doming                                           Therapeutic Activities (04433)       Squatting                             Neuromuscular Re-ed (33270)       Tandem Stance       SLS Manual Intervention (02301)       Talocrural Mobs       Subtalar Mobs       Toe Flexion / Extension Stretch                                Pt. Education:  -pt educated on diagnosis, prognosis and expectations for rehab  -all pt questions were answered    Home Exercise Program:  Access Code: [email or web address]   URL: [email or web address]   Date: 09/04/2020   Prepared by: Idris Nowak into [place] for patient   Exercises   Standing Heel Raise - 10 reps - 3 sets - 1x daily - 7x weekly   Standing Ankle Dorsiflexion with Table Support - 10 reps - 3 sets - 1x daily - 7x weekly   Mini Squat with Chair - 10 reps - 2 sets - 1x daily - 7x weekly   Gastroc Stretch on Wall - 3 reps - 20 hold - 1x daily - 7x weekly   Standing Gastroc Stretch on Step - 3 reps - 60s hold - 2-3x daily - 7x weekly   Standing Soleus Stretch on Step - 3 reps - 60s hold - 2-3x daily - 7x weekly   Seated Ankle Circles - 10 reps - 3 sets - 1x daily - 7x weekly   Seated Toe Flexion Extension PROM - 10 reps - 10s hold - 2x daily - 7x weekly       Therapeutic Exercise and NMR EXR  [] (33640) Provided verbal/tactile cueing for activities related to strengthening, flexibility, endurance, ROM for improvements in LE, proximal hip, and core control with self care, mobility, lifting, ambulation.  [] (86792) Provided verbal/tactile cueing for activities related to improving balance, coordination, kinesthetic sense, posture, motor skill, proprioception  to assist with LE, proximal hip, and core control in self care, mobility, lifting, ambulation and eccentric single leg control.   [] (65415) Therapist is in constant attendance of 2 or more patients providing skilled therapy interventions, but not providing any significant amount of measurable one-on-one time to either patient, for improvements in LE, proximal hip, and core control in self care, mobility, lifting, ambulation and eccentric single leg control.      NMR and Therapeutic Activities:    [x] (28286 or ) Provided verbal/tactile cueing for activities related to improving balance, coordination, kinesthetic sense, posture, motor skill, proprioception and motor activation to allow for proper function of core, proximal hip and LE with self care and ADLs  [] (03196) Gait Re-education- Provided training and instruction to the patient for proper LE, core and proximal hip recruitment and positioning and eccentric body weight control with ambulation re-education including up and down stairs     Home Exercise Program:    [x] (74493) Reviewed/Progressed HEP activities related to strengthening, flexibility, endurance, ROM of core, proximal hip and LE for functional self-care, mobility, lifting and ambulation/stair navigation   [] (09786)Reviewed/Progressed HEP activities related to improving balance, coordination, kinesthetic sense, posture, motor skill, proprioception of core, proximal hip and LE for self care, mobility, lifting, and ambulation/stair navigation      Manual Treatments:  PROM / STM / Oscillations-Mobs:  G-I, II, III, IV (PA's, Inf., Post.)  [] (16898) Provided manual therapy to mobilize LE, proximal hip and/or LS spine soft tissue/joints for the purpose of modulating pain, promoting relaxation,  increasing ROM, reducing/eliminating soft tissue swelling/inflammation/restriction, improving soft tissue extensibility and allowing for proper ROM for normal function with self care, mobility, lifting and ambulation. Modalities:  [] (59310) Vasopneumatic compression: Utilized vasopneumatic compression to decrease edema / swelling for the purpose of improving mobility and quad tone / recruitment which will allow for increased overall function including but not limited to self-care, transfers, ambulation, and ascending / descending stairs.        Modalities:      Charges:  Timed Code Treatment Minutes: 20   Total Treatment Minutes: 50     [x] EVAL - LOW (20814)   [] EVAL - MOD (95736)  [] EVAL - HIGH (57270)  [] RE-EVAL (91409)  [] NN(96530) x      [] Ionto  [] NMR (25554) x      [] Vaso  [] Manual (86697) x      [] Ultrasound  [x] TA x 1      [] Mech Traction (22061)  [] Aquatic Therapy x     [] ES (un) (58260):   [] Home Management Training x [] ES(attended) (13557)   [] Group:     [] Other:     GOALS:  Patient stated goal: improve ankle motion, decrease pain   []? Progressing: []? Met: []? Not Met: []? Adjusted     Therapist goals for Patient:   Short Term Goals: To be achieved in: 2 weeks  1. Independent in HEP and progression per patient tolerance, in order to prevent re-injury. []? Progressing: []? Met: []? Not Met: []? Adjusted  2. Patient will have a decrease in pain to facilitate improvement in movement, function, and ADLs as indicated by Functional Deficits. []? Progressing: []? Met: []? Not Met: []? Adjusted     Long Term Goals: To be achieved in: 4-6 weeks  1. Disability index score of 25% or less for the LEFS to assist with reaching prior level of function. []? Progressing: []? Met: []? Not Met: []? Adjusted  2. Patient will demonstrate increased AROM DF to 15-20deg to allow for proper joint functioning as indicated by patients Functional Deficits. []? Progressing: []? Met: []? Not Met: []? Adjusted  3. Patient will demonstrate an increase in Strength to at least 4+/5 or greater as well as good proximal hip strength and control to allow for proper functional mobility as indicated by patients Functional Deficits. []? Progressing: []? Met: []? Not Met: []? Adjusted  4. Patient will return to functional activities including walking without increased symptoms or restriction. []? Progressing: []? Met: []? Not Met: []? Adjusted  5. Patient will return to functional activities including ability to navigate stairs with reciprocal pattern and 1 UE A without increased symptoms or restriction. []? Progressing: []? Met: []? Not Met: []?  Adjusted      Overall Progression Towards Functional goals/

## 2020-09-04 NOTE — PLAN OF CARE
[address] [address], [address] [place] [address] Drive  Phone: [phone]   Fax: [phone]                                                       Physical Therapy Certification    Dear Referring Practitioner: Altagracia Mckeon MD,    We had the pleasure of evaluating the following patient for physical therapy services at [address] [place] [address]. A summary of our findings can be found in the initial assessment below. This includes our plan of care. If you have any questions or concerns regarding these findings, please do not hesitate to contact me at the office phone number checked above. Thank you for the referral.       Physician Signature:_______________________________Date:__________________  By signing above (or electronic signature), therapists plan is approved by physician      Patient: Rob Caputo   : 1965   MRN: [id number]  Referring Physician: Referring Practitioner: Altagracia Mckeon MD      Evaluation Date: 2020      Medical Diagnosis Information:  Diagnosis: M00.9 (ICD-10-CM) - Septic arthritis of left ankle, due to unspecified organism [facility])   Treatment Diagnosis: L ankle hypomobility at talocrural, subtalar, and digits; decreased L ankle ROM, decreased L ankle strength, abnormal gait, decreased balance      Insurance information: PT Insurance Information: Self Pay     Precautions/ Contra-indications: n/a  Latex Allergy:  [x]NO      []YES  Preferred Language for Healthcare:   [x]English       []other:    SUBJECTIVE: From 2020: Patient stated complaint: Pt reports her L ankle pain has been a chronic thing since July of last year with insidious onset of L ankle pain and progressively got worse for a long while.  Pt reports after several visits with MD and various treatment techniques, nothing really helped and after MRI she underwent surgery to clean out her ankle and was found to have an infection that got into her ankle somehow. Was on antibiotics for a while and since her surgery in October, her ankle is progressively getting better but still really stiff and somewhat painful and irritating. Now she feels that she just needs to get her ankle moving better and a little stronger but does not know what to work on at home. Update 9/4/2020: she continues to do better, reports moving her feet from side to side is still difficult along with lifting her foot up. She has limited movement. She is able to navigate stairs normally at times, but also can only sometimes do 1 step at a time due to her limited motion on \"stiff days\". She has pain with stair navigation at 3-5/10. She reports she still has pain on a daily basis. Pt reports she is self pay and is unemployed, she is worried about her payments for therapy. She previously filled out the financial assistance form and says she was denied. Relevant Medical History: unremarkable  Functional Outcome: LEFS raw score = 52; dysfunction = 35%    Pain Scale: 3-5/10  Easing factors: resting, ice (mild help)   Provocative factors: weight bearing, long duration walking, stairs     Type: [x]Constant   []Intermittent  []Radiating [x]Localized []Other:     Numbness/Tingling: \"nerve pain\" shooting through feet at times over the past couple of weeks    Occupation/School: unemployed     Living Status/Prior Level of Function:Prior to this injury / incident, pt was independent with ADLs and IADLs, household work, community mobility without difficulty.        OBJECTIVE:   Palpation: TPP not present at ankle, reports global pain    Functional Mobility/Transfers: decreased L ankle mobility with squats, sit<>stand transitions; performs independently     Posture: decreased arches B (L > R), L calcaneal eversion with pronation     Bandages/Dressings/Incisions: n/a    Gait: independent w/o AD; decreased stance time on R LE, shorted step length on L LE, midfoot initial contact on L with early heel rise at terminal stance on L     Dermatomes Normal Abnormal Comments   inguinal area (L1)       anterior mid-thigh (L2) x     distal ant thigh/med knee (L3) x     medial lower leg and foot (L4) x     lateral lower leg and foot (L5) x     posterior calf (S1) x     medial calcaneus (S2) x          PROM AROM    L R L R   Dorsiflexion    12 20   Plantarflexion    22 45   Inversion    10 35   Eversion    8 20       Strength (0-5) / Myotomes Left Right   Ankle Dorsiflexion (L4-5) 5 5   Ankle Plantarflexion (S1-2) 3 4   Ankle Inversion 4 5   Ankle Eversion (S1-2) 4 5   Great Toe Extension (L5)            Joint mobility: L talocrual    []Normal    [x]Hypo   []Hyper    Orthopaedic Special Tests Positive  Negative  NT Comments    Ankle       Anterior Drawer  x     Talar Tilt  x     Vyas  x     Brock's   x              Balance: SLS on R = 4s; SLS on L = 2s; fair balance overall, decrease motion with limited ankle strategy available on L                        [x] Patient history, allergies, meds reviewed. Medical chart reviewed. See intake form. Review Of Systems (ROS):  [x]Performed Review of systems (Integumentary, CardioPulmonary, Neurological) by intake and observation. Intake form has been scanned into medical record. Patient has been instructed to contact their primary care physician regarding ROS issues if not already being addressed at this time.       Co-morbidities/Complexities (which will affect course of rehabilitation):   [x]None           Arthritic conditions   []Rheumatoid arthritis (M05.9)  []Osteoarthritis (M19.91)   Cardiovascular conditions   []Hypertension (I10)  []Hyperlipidemia (E78.5)  []Angina pectoris (I20)  []Atherosclerosis (I70)   Musculoskeletal conditions   []Disc pathology   []Congenital spine pathologies   []Prior surgical intervention  []Osteoporosis (M81.8)  []Osteopenia (M85.8)   Endocrine conditions   []Hypothyroid (E03.9)  []Hyperthyroid Gastrointestinal conditions   []Constipation (F58.24)   Metabolic conditions   []Morbid obesity (E66.01)  []Diabetes type 1(E10.65) or 2 (E11.65)   []Neuropathy (G60.9)     Pulmonary conditions   []Asthma (J45)  []Coughing   []COPD (J44.9)   Psychological Disorders  []Anxiety (F41.9)  []Depression (F32.9)   []Other:   []Other:          Barriers to/and or personal factors that will affect rehab potential:              [x]Age  [x]Sex    []Smoker              [x]Motivation/Lack of Motivation                        [x]Co-Morbidities              []Cognitive Function, education/learning barriers              []Environmental, home barriers              []profession/work barriers  []past PT/medical experience  []other:  Justification: minimal impact of course from co morbidities, increased age and relatively sedentary lifestyle negatively impacting course. Falls Risk Assessment (30 days):   [x] Falls Risk assessed and no intervention required. [] Falls Risk assessed and Patient requires intervention due to being higher risk   TUG score (>12s at risk):     [] Falls education provided, including        ASSESSMENT: Pt is a 51 y/o female presenting to PT with decreased L ankle mobility, flexibility, ROM, strength, balance and proprioception all contributing to gait deviations and limitations with ADL's, IADL's and recreational activity. Pt will benefit from PT to address these issues to facilitate improvements with ADL's, IADL's, recreational activity.     Functional Impairments:     [x]Noted lumbar/proximal hip/LE joint hypomobility   [x]Decreased LE functional ROM   [x]Decreased core/proximal hip strength and neuromuscular control   [x]Decreased LE functional strength   [x]Reduced balance/proprioceptive control   []other:      Functional Activity Limitations (from functional questionnaire and intake)   []Reduced ability to tolerate prolonged functional positions   []Reduced ability or difficulty with changes of positions or transfers between positions   [x]Reduced ability to maintain good posture and demonstrate good body mechanics with sitting, bending, and lifting   []Reduced ability to sleep   [] Reduced ability or tolerance with driving and/or computer work   []Reduced ability to perform lifting, carrying tasks   [x]Reduced ability to squat   []Reduced ability to forward bend   [x]Reduced ability to ambulate prolonged functional periods/distances/surfaces   [x]Reduced ability to ascend/descend stairs   [x]Reduced ability to run, hop, cut or jump   []other:    Participation Restrictions   [x]Reduced participation in self care activities   [x]Reduced participation in home management activities   []Reduced participation in work activities   [x]Reduced participation in social activities. []Reduced participation in sport/recreation activities. Classification :    [x]Signs/symptoms consistent with post-surgical status including decreased ROM, strength and function.    []Signs/symptoms consistent with joint sprain/strain   []Signs/symptoms consistent with patella-femoral syndrome   []Signs/symptoms consistent with knee OA/hip OA   []Signs/symptoms consistent with internal derangement of knee/Hip   []Signs/symptoms consistent with functional hip weakness/NMR control      []Signs/symptoms consistent with tendinitis/tendinosis    []signs/symptoms consistent with pathology which may benefit from Dry needling      []other:      Prognosis/Rehab Potential:      []Excellent   [x]Good    []Fair   []Poor    Tolerance of evaluation/treatment:    []Excellent   [x]Good    []Fair   []Poor    Physical Therapy Evaluation Complexity Justification  [x] A history of present problem with:  [] no personal factors and/or comorbidities that impact the plan of care;  [x]1-2 personal factors and/or comorbidities that impact the plan of care  []3 personal factors and/or comorbidities that impact the plan of care  [x] An examination of body systems using standardized tests and measures addressing any of the following: body structures and functions (impairments), activity limitations, and/or participation restrictions;:  [] a total of 1-2 or more elements   [x] a total of 3 or more elements   [] a total of 4 or more elements   [x] A clinical presentation with:  [x] stable and/or uncomplicated characteristics   [] evolving clinical presentation with changing characteristics  [] unstable and unpredictable characteristics;   [x] Clinical decision making of [x] low, [] moderate, [] high complexity using standardized patient assessment instrument and/or measurable assessment of functional outcome. [x] EVAL (LOW) 37667 (typically 15 minutes face-to-face)  [] EVAL (MOD) 36855 (typically 30 minutes face-to-face)  [] EVAL (HIGH) 06657 (typically 45 minutes face-to-face)  [] RE-EVAL     PLAN:   Frequency/Duration:  1-2 days per week for 4-6 Weeks:  Interventions:  [x]  Therapeutic exercise including: strength training, ROM, for Lower extremity and core   [x]  NMR activation and proprioception for LE, Glutes and Core   [x]  Manual therapy as indicated for LE, Hip and spine to include: Dry Needling/IASTM, STM, PROM, Gr I-IV mobilizations, manipulation. [x] Modalities as needed that may include: thermal agents, E-stim, Biofeedback, US, iontophoresis as indicated  [x] Patient education on joint protection, postural re-education, activity modification, progression of HEP. HEP instruction: Written HEP instructions provided and reviewed     GOALS:  Patient stated goal: improve ankle motion, decrease pain   [] Progressing: [] Met: [] Not Met: [] Adjusted    Therapist goals for Patient:   Short Term Goals: To be achieved in: 2 weeks  1. Independent in HEP and progression per patient tolerance, in order to prevent re-injury. [] Progressing: [] Met: [] Not Met: [] Adjusted  2.  Patient will have a decrease in pain to facilitate improvement in movement, function, and ADLs as

## 2020-09-18 ENCOUNTER — HOSPITAL ENCOUNTER (OUTPATIENT)
Dept: PHYSICAL THERAPY | Age: 55
Setting detail: THERAPIES SERIES
Discharge: HOME | End: 2020-09-18

## 2020-09-18 PROCEDURE — 97140 MANUAL THERAPY 1/> REGIONS: CPT

## 2020-09-18 PROCEDURE — 97110 THERAPEUTIC EXERCISES: CPT

## 2020-09-18 NOTE — FLOWSHEET NOTE
[address] [facility]  Phone: [phone]   Fax: [phone]    Physical Therapy Treatment Note/ Progress Report:     Date:  2020    Patient Name:  Malu Holly    :  1965  MRN: [id number]  Restrictions/Precautions:    Medical/Treatment Diagnosis Information:  Diagnosis: M00.9 (ICD-10-CM) - Septic arthritis of left ankle, due to unspecified organism [facility])  Treatment Diagnosis: L ankle hypomobility at talocrural, subtalar, and digits; decreased L ankle ROM, decreased L ankle strength, abnormal gait, decreased balance       Insurance/Certification information:  PT Insurance Information: Self Pay  Physician Information:  Referring Practitioner: Chris Haas MD  Plan of care signed (Y/N): []  Yes [x]  No     Date of Patient follow up with Physician: ALEJANDRA     Progress Report: [x]  Yes  []  No     Date Range for reporting period:  Beginnin2020  Ending:     Progress report due (10 Rx/or 30 days whichever is less): visit #10 or 6437     Recertification due (POC duration/ or 90 days whichever is less): visit #12 or 10/16/2020 (6 weeks)     Visit # Insurance Allowable Auth required? Date Range   2 Self Pay []  Yes  [x]  No -     Latex Allergy:  [x]NO      []YES  Preferred Language for Healthcare:   [x]English       []other:    Functional Scale:        Date assessed:  LEFS: raw score = 52; dysfunction = 35%   2020    Pain level:  2/10     SUBJECTIVE: Pt reports she is currently have pain at the lateral/top of foot, but it is not bad. She has been having increased pain with her HEP, but it feels like workout pain. She is wondering if she needs to wear shoes during her home program. Also she is doing the ABC's from the last time she was in therapy. How many times should she do it.      *phone  used for this session    OBJECTIVE: See eval      RESTRICTIONS/PRECAUTIONS: previous L ankle clean out for infection     Exercises/Interventions:     Therapeutic Exercise (09948)  Resistance / level Sets/sec Reps Notes / Cues   IB - Gastroc Stretch  30\" 2    IB - Soleus Stretch  30\" 2    Ankle Stretches        Ankle Circles        TB resisted DF  TB resisted PF  TB resisted Inv  TB resisted Evr orange 2 10 Added 9/18   Foot Doming  npv                                  Pt ed regarding appropriate performance (reps, frequency, foot wear) for HEP  X 5 min     Therapeutic Activities (55549)       Squatting  npv                           Neuromuscular Re-ed (68242)       Tandem Stance  10\" 2    SLS  30\" 4 L           Manual Intervention (56676)       Talocrural distraction and A-P glide  4 min     Subtalar distraction  4 min     MWM subtalar distraction with AROM DF  2 10 reps    Great toe ext PROM, 1st ray PROM DF/Evr  3 min                              Pt. Education:  -pt educated on diagnosis, prognosis and expectations for rehab  -all pt questions were answered    Home Exercise Program:  Access Code: [email or web address]   URL: [employer]/   Date: 09/04/2020   Prepared by: Silvino Paz into [place] for patient   Exercises   Standing Heel Raise - 10 reps - 3 sets - 1x daily - 7x weekly   Standing Ankle Dorsiflexion with Table Support - 10 reps - 3 sets - 1x daily - 7x weekly   Mini Squat with Chair - 10 reps - 2 sets - 1x daily - 7x weekly   Gastroc Stretch on Wall - 3 reps - 20 hold - 1x daily - 7x weekly   Standing Gastroc Stretch on Step - 3 reps - 60s hold - 2-3x daily - 7x weekly   Standing Soleus Stretch on Step - 3 reps - 60s hold - 2-3x daily - 7x weekly   Seated Ankle Circles - 10 reps - 3 sets - 1x daily - 7x weekly   Seated Toe Flexion Extension PROM - 10 reps - 10s hold - 2x daily - 7x weekly     Access Code: [email or web address]   URL: [employer]/   Date: 09/18/2020   Prepared by: Julieta Carrier     Exercises   Tandem Stance - 4 reps - 1 sets - 30 hold - 1x daily - 7x weekly       Therapeutic Exercise and NMR EXR  [x] (35613) Provided verbal/tactile cueing for activities related to strengthening, flexibility, endurance, ROM for improvements in LE, proximal hip, and core control with self care, mobility, lifting, ambulation.  [] (65432) Provided verbal/tactile cueing for activities related to improving balance, coordination, kinesthetic sense, posture, motor skill, proprioception  to assist with LE, proximal hip, and core control in self care, mobility, lifting, ambulation and eccentric single leg control.   [] (33853) Therapist is in constant attendance of 2 or more patients providing skilled therapy interventions, but not providing any significant amount of measurable one-on-one time to either patient, for improvements in LE, proximal hip, and core control in self care, mobility, lifting, ambulation and eccentric single leg control.      NMR and Therapeutic Activities:    [x] (91310 or 75162) Provided verbal/tactile cueing for activities related to improving balance, coordination, kinesthetic sense, posture, motor skill, proprioception and motor activation to allow for proper function of core, proximal hip and LE with self care and ADLs  [] (39203) Gait Re-education- Provided training and instruction to the patient for proper LE, core and proximal hip recruitment and positioning and eccentric body weight control with ambulation re-education including up and down stairs     Home Exercise Program:    [x] (99418) Reviewed/Progressed HEP activities related to strengthening, flexibility, endurance, ROM of core, proximal hip and LE for functional self-care, mobility, lifting and ambulation/stair navigation   [] (45302)Reviewed/Progressed HEP activities related to improving balance, coordination, kinesthetic sense, posture, motor skill, proprioception of core, proximal hip and LE for self care, mobility, lifting, and ambulation/stair navigation      Manual Treatments:  PROM / STM / Oscillations-Mobs:  G-I, II, III, IV (PA's, Inf., Post.)  [x] (31452) Provided manual therapy to mobilize LE, proximal hip and/or LS spine soft tissue/joints for the purpose of modulating pain, promoting relaxation,  increasing ROM, reducing/eliminating soft tissue swelling/inflammation/restriction, improving soft tissue extensibility and allowing for proper ROM for normal function with self care, mobility, lifting and ambulation. Modalities:  [] (69796) Vasopneumatic compression: Utilized vasopneumatic compression to decrease edema / swelling for the purpose of improving mobility and quad tone / recruitment which will allow for increased overall function including but not limited to self-care, transfers, ambulation, and ascending / descending stairs. Modalities:      Charges:  Timed Code Treatment Minutes: 37   Total Treatment Minutes: 37     [] EVAL - LOW (35114)   [] EVAL - MOD (26475)  [] EVAL - HIGH (13994)  [] RE-EVAL (93962)  [x] DN(58518) x   1   [] Ionto  [] NMR (69489) x      [] Vaso  [x] Manual (34087) x   1   [] Ultrasound  [] TA x     [] Mech Traction (49150)  [] Aquatic Therapy x     [] ES (un) (42956):   [] Home Management Training x [] ES(attended) (33349)   [] Group:     [] Other:     GOALS:  Patient stated goal: improve ankle motion, decrease pain   []? Progressing: []? Met: []? Not Met: []? Adjusted     Therapist goals for Patient:   Short Term Goals: To be achieved in: 2 weeks  1. Independent in HEP and progression per patient tolerance, in order to prevent re-injury. []? Progressing: []? Met: []? Not Met: []? Adjusted  2. Patient will have a decrease in pain to facilitate improvement in movement, function, and ADLs as indicated by Functional Deficits. []? Progressing: []? Met: []? Not Met: []? Adjusted     Long Term Goals: To be achieved in: 4-6 weeks  1. Disability index score of 25% or less for the LEFS to assist with reaching prior level of function. []? Progressing: []? Met: []? Not Met: []? Adjusted  2.  Patient will demonstrate increased AROM direction as well as with balance training. Continues to present with rear foot and first ray hypomobility. Continue to address jt mobility, strength, and balance at next visit. Please follow up with pt ed regarding HEP. Treatment/Activity Tolerance:  [x] Pt able to complete treatment [] Patient limited by fatique  [] Patient limited by pain  [] Patient limited by other medical complications  [] Other:     Prognosis: [x] Good [] Fair  [] Poor    Patient Requires Follow-up: [x] Yes  [] No    Return to Play:    [x]  N/A    PLAN: See eval. PT 1-2x / week for 4-6 weeks. Start 1x/week for 4 weeks @ 30 min sessions due to self pay   [x] Continue per plan of care [] Alter current plan (see comments)  [] Plan of care initiated [] Hold pending MD visit [] Discharge    Electronically signed by: Bj Pain PT, DPT      Note: If patient does not return for scheduled/ recommended follow up visits, this note will serve as a discharge from care along with most recent update on progress.

## 2020-09-25 ENCOUNTER — HOSPITAL ENCOUNTER (OUTPATIENT)
Dept: PHYSICAL THERAPY | Age: 55
Setting detail: THERAPIES SERIES
Discharge: HOME | End: 2020-09-25

## 2020-09-25 PROCEDURE — 97530 THERAPEUTIC ACTIVITIES: CPT

## 2020-09-25 PROCEDURE — 97110 THERAPEUTIC EXERCISES: CPT

## 2020-09-25 NOTE — FLOWSHEET NOTE
eval      RESTRICTIONS/PRECAUTIONS: previous L ankle clean out for infection     Exercises/Interventions:     Therapeutic Exercise (38912)  Resistance / level Sets/sec Reps Notes / Cues   IB - Gastroc Stretch  30\" 2    IB - Soleus Stretch  30\" 2    Ankle Stretches        Ankle Circles        TB resisted DF  TB resisted PF  TB resisted Inv  TB resisted Evr Blue  Blue  Lime  Lime 2  2  2  2 10  10  10  10 Added 9/18    Added to HEP 9/25  Added to HEP 9/25   Foot Doming   2/5\" 10 Added 9/25/HEP                               Pt ed regarding appropriate performance (reps, frequency, foot wear) for HEP  X 5 min     Therapeutic Activities (84199)       Squatting  npv      Pt ed regarding appropriate footwear  X 8 min                   Neuromuscular Re-ed (48546)       Tandem Stance  10\" 2    SLS  30\" 4 L    Semi tandem eyes closed  30\" 2 L Added 9/25   Manual Intervention (68875)       Talocrural distraction and A-P glide  4 min  Resume npv   Subtalar distraction  4 min  Resume npv   MWM subtalar distraction with AROM DF  2 10 reps Resume npv   Great toe ext PROM, 1st ray PROM DF/Evr  3 min  Resume npv                            Pt. Education:  -pt educated on diagnosis, prognosis and expectations for rehab  -all pt questions were answered    Home Exercise Program:  Access Code: [email or web address]   URL: [email or web address]   Date: 09/04/2020   Prepared by: Cayetano Alford into Wallisian for patient   Exercises   Standing Heel Raise - 10 reps - 3 sets - 1x daily - 7x weekly   Standing Ankle Dorsiflexion with Table Support - 10 reps - 3 sets - 1x daily - 7x weekly   Mini Squat with Chair - 10 reps - 2 sets - 1x daily - 7x weekly   Gastroc Stretch on Wall - 3 reps - 20 hold - 1x daily - 7x weekly   Standing Gastroc Stretch on Step - 3 reps - 60s hold - 2-3x daily - 7x weekly   Standing Soleus Stretch on Step - 3 reps - 60s hold - 2-3x daily - 7x weekly   Seated Ankle Circles - 10 reps - 3 sets - 1x daily - 7x weekly Seated Toe Flexion Extension PROM - 10 reps - 10s hold - 2x daily - 7x weekly     Access Code: [email or web address]   URL: [email or web address]/   Date: 09/18/2020   Prepared by: Kay Stewart into Malaysian for patient  Exercises   Tandem Stance - 4 reps - 1 sets - 30 hold - 1x daily - 7x weekly     Access Code: [email or web address]   URL: [email or web address]   Date: 09/25/2020   Prepared by: Kay Stewart into Malaysian for patient  Exercises   Seated Ankle Inversion with Resistance and Legs Crossed - 10 reps - 2 sets - 1x daily - 4x weekly   Ankle Eversion with Resistance - 10 reps - 2 sets - 1x daily - 4x weekly   Seated Arch Lifts - 10 reps - 2 sets - 5 hold - 1x daily - 4x weekly         Therapeutic Exercise and NMR EXR  [x] (70891) Provided verbal/tactile cueing for activities related to strengthening, flexibility, endurance, ROM for improvements in LE, proximal hip, and core control with self care, mobility, lifting, ambulation.  [] (97765) Provided verbal/tactile cueing for activities related to improving balance, coordination, kinesthetic sense, posture, motor skill, proprioception  to assist with LE, proximal hip, and core control in self care, mobility, lifting, ambulation and eccentric single leg control.   [] (59135) Therapist is in constant attendance of 2 or more patients providing skilled therapy interventions, but not providing any significant amount of measurable one-on-one time to either patient, for improvements in LE, proximal hip, and core control in self care, mobility, lifting, ambulation and eccentric single leg control.      NMR and Therapeutic Activities:    [x] (42152 or 27495) Provided verbal/tactile cueing for activities related to improving balance, coordination, kinesthetic sense, posture, motor skill, proprioception and motor activation to allow for proper function of core, proximal hip and LE with self care and ADLs  [] (06142) Gait Re-education- Provided training and instruction to the patient for proper LE, core and proximal hip recruitment and positioning and eccentric body weight control with ambulation re-education including up and down stairs     Home Exercise Program:    [x] (33276) Reviewed/Progressed HEP activities related to strengthening, flexibility, endurance, ROM of core, proximal hip and LE for functional self-care, mobility, lifting and ambulation/stair navigation   [] (30540)Reviewed/Progressed HEP activities related to improving balance, coordination, kinesthetic sense, posture, motor skill, proprioception of core, proximal hip and LE for self care, mobility, lifting, and ambulation/stair navigation      Manual Treatments:  PROM / STM / Oscillations-Mobs:  G-I, II, III, IV (PA's, Inf., Post.)  [x] (70806) Provided manual therapy to mobilize LE, proximal hip and/or LS spine soft tissue/joints for the purpose of modulating pain, promoting relaxation,  increasing ROM, reducing/eliminating soft tissue swelling/inflammation/restriction, improving soft tissue extensibility and allowing for proper ROM for normal function with self care, mobility, lifting and ambulation. Modalities:  [] (71020) Vasopneumatic compression: Utilized vasopneumatic compression to decrease edema / swelling for the purpose of improving mobility and quad tone / recruitment which will allow for increased overall function including but not limited to self-care, transfers, ambulation, and ascending / descending stairs.        Modalities:      Charges:  Timed Code Treatment Minutes: 38   Total Treatment Minutes: 45     [] EVAL - LOW (71034)   [] EVAL - MOD (71101)  [] EVAL - HIGH (08837)  [] RE-EVAL (70652)  [x] RP(13737) x   1   [] Ionto  [] NMR (21333) x      [] Vaso  [] Manual (32338) x    [] Ultrasound  [x] TA x 1    [] Mech Traction (72473)  [] Aquatic Therapy x     [] ES (un) (62047):   [] Home Management Training x [] ES(attended) (18596)   [] Group:     [] Other:     GOALS:  Patient stated goal: improve ankle motion, decrease pain   []? Progressing: []? Met: []? Not Met: []? Adjusted     Therapist goals for Patient:   Short Term Goals: To be achieved in: 2 weeks  1. Independent in HEP and progression per patient tolerance, in order to prevent re-injury. []? Progressing: []? Met: []? Not Met: []? Adjusted  2. Patient will have a decrease in pain to facilitate improvement in movement, function, and ADLs as indicated by Functional Deficits. []? Progressing: []? Met: []? Not Met: []? Adjusted     Long Term Goals: To be achieved in: 4-6 weeks  1. Disability index score of 25% or less for the LEFS to assist with reaching prior level of function. []? Progressing: []? Met: []? Not Met: []? Adjusted  2. Patient will demonstrate increased AROM DF to 15-20deg to allow for proper joint functioning as indicated by patients Functional Deficits. []? Progressing: []? Met: []? Not Met: []? Adjusted  3. Patient will demonstrate an increase in Strength to at least 4+/5 or greater as well as good proximal hip strength and control to allow for proper functional mobility as indicated by patients Functional Deficits. []? Progressing: []? Met: []? Not Met: []? Adjusted  4. Patient will return to functional activities including walking without increased symptoms or restriction. []? Progressing: []? Met: []? Not Met: []? Adjusted  5. Patient will return to functional activities including ability to navigate stairs with reciprocal pattern and 1 UE A without increased symptoms or restriction. []? Progressing: []? Met: []? Not Met: []? Adjusted      Overall Progression Towards Functional goals/ Treatment Progress Update:  [] Patient is progressing as expected towards functional goals listed. [] Progression is slowed due to complexities/Impairments listed. [] Progression has been slowed due to co-morbidities.   [x] Plan just implemented, too soon to assess goals progression <30days   [] Goals require adjustment due to lack of progress  [] Patient is not progressing as expected and requires additional follow up with physician  [] Other    Persisting Functional Limitations/Impairments:  []Sitting [x]Standing   [x]Walking [x]Stairs   [x]Transfers [x]ADLs   [x]Squatting/bending []Kneeling  [x]Housework []Job related tasks  []Driving []Sports/Recreation   []Sleeping []Other:    ASSESSMENT:  Pt tolerates updated HEP well. Has significant difficulty isolating foot intrinsics vs toe flexors to produce arch doming. Pt demonstrates understanding after pt ed. Is able to tolerate above interventions without increased symptoms. Pt fatigues rapidly with TB resisted strengthening in each direction as well as with balance training. Continues to present with rear foot and first ray hypomobility. Continue to address jt mobility, strength, and balance at next visit. Please follow up with pt ed regarding HEP. Treatment/Activity Tolerance:  [x] Pt able to complete treatment [] Patient limited by fatique  [] Patient limited by pain  [] Patient limited by other medical complications  [] Other:     Prognosis: [x] Good [] Fair  [] Poor    Patient Requires Follow-up: [x] Yes  [] No    Return to Play:    [x]  N/A    PLAN: See eval. PT 1-2x / week for 4-6 weeks. Start 1x/week for 4 weeks @ 30 min sessions due to self pay   [x] Continue per plan of care [] Alter current plan (see comments)  [] Plan of care initiated [] Hold pending MD visit [] Discharge    Electronically signed by: Mustapha Booth PT, DPT      Note: If patient does not return for scheduled/ recommended follow up visits, this note will serve as a discharge from care along with most recent update on progress.

## 2020-10-02 ENCOUNTER — HOSPITAL ENCOUNTER (OUTPATIENT)
Dept: PHYSICAL THERAPY | Age: 55
Setting detail: THERAPIES SERIES
Discharge: HOME | End: 2020-10-02

## 2020-10-02 PROCEDURE — 97110 THERAPEUTIC EXERCISES: CPT

## 2020-10-02 PROCEDURE — 97140 MANUAL THERAPY 1/> REGIONS: CPT

## 2020-10-02 NOTE — FLOWSHEET NOTE
[address] [facility] Physical Therapy  Phone: [phone]   Fax: [phone]    Physical Therapy Treatment Note/ Progress Report:     Date:  10/2/2020    Patient Name:  Agata Ko    :  1965  MRN: [id number]  Restrictions/Precautions:    Medical/Treatment Diagnosis Information:  Diagnosis: M00.9 (ICD-10-CM) - Septic arthritis of left ankle, due to unspecified organism [facility])  Treatment Diagnosis: L ankle hypomobility at talocrural, subtalar, and digits; decreased L ankle ROM, decreased L ankle strength, abnormal gait, decreased balance       Insurance/Certification information:  PT Insurance Information: Self Pay  Physician Information:  Referring Practitioner: Florecita Riley MD  Plan of care signed (Y/N): []  Yes [x]  No     Date of Patient follow up with Physician: ALEJANDRA     Progress Report: [x]  Yes  []  No     Date Range for reporting period:  Beginnin2020  Ending:     Progress report due (10 Rx/or 30 days whichever is less): visit #10 or      Recertification due (POC duration/ or 90 days whichever is less): visit #12 or 10/16/2020 (6 weeks)     Visit # Insurance Allowable Auth required?  Date Range   4 Self Pay []  Yes  [x]  No -     Latex Allergy:  [x]NO      []YES  Preferred Language for Healthcare:   [x]English       []other:    Functional Scale:        Date assessed:  LEFS: raw score = 52; dysfunction = 35%   2020    Pain level:  3/10     SUBJECTIVE: Pt reports    *phone  used for this session    OBJECTIVE: See eval      RESTRICTIONS/PRECAUTIONS: previous L ankle clean out for infection     Exercises/Interventions:     Therapeutic Exercise (70593)  Resistance / level Sets/sec Reps Notes / Cues   IB - Gastroc Stretch  30\" 2    IB - Soleus Stretch  30\" 2    Ankle Stretches        Ankle Circles        TB resisted DF  TB resisted PF  TB resisted Inv  TB resisted Evr Blue  Blue  Lime  Lime 2  2  2  2 10  10  10  10 Added     Added to HEP   Added Translated into [place] for patient  Exercises   Seated Ankle Inversion with Resistance and Legs Crossed - 10 reps - 2 sets - 1x daily - 4x weekly   Ankle Eversion with Resistance - 10 reps - 2 sets - 1x daily - 4x weekly   Seated Arch Lifts - 10 reps - 2 sets - 5 hold - 1x daily - 4x weekly         Therapeutic Exercise and NMR EXR  [x] (48599) Provided verbal/tactile cueing for activities related to strengthening, flexibility, endurance, ROM for improvements in LE, proximal hip, and core control with self care, mobility, lifting, ambulation.  [] (76684) Provided verbal/tactile cueing for activities related to improving balance, coordination, kinesthetic sense, posture, motor skill, proprioception  to assist with LE, proximal hip, and core control in self care, mobility, lifting, ambulation and eccentric single leg control.   [] (88522) Therapist is in constant attendance of 2 or more patients providing skilled therapy interventions, but not providing any significant amount of measurable one-on-one time to either patient, for improvements in LE, proximal hip, and core control in self care, mobility, lifting, ambulation and eccentric single leg control.      NMR and Therapeutic Activities:    [x] (48756 or 99131) Provided verbal/tactile cueing for activities related to improving balance, coordination, kinesthetic sense, posture, motor skill, proprioception and motor activation to allow for proper function of core, proximal hip and LE with self care and ADLs  [] (51936) Gait Re-education- Provided training and instruction to the patient for proper LE, core and proximal hip recruitment and positioning and eccentric body weight control with ambulation re-education including up and down stairs     Home Exercise Program:    [x] (71141) Reviewed/Progressed HEP activities related to strengthening, flexibility, endurance, ROM of core, proximal hip and LE for functional self-care, mobility, lifting and ambulation/stair well. Is able to tolerate above interventions without increased symptoms. Pt fatigues rapidly with TB resisted strengthening in each direction as well as with balance training. Continues to present with rear foot and first ray hypomobility. Continue to address jt mobility, strength, and balance at next visit. Focus on improved foot posture (decreased subtalar pronation) in CKC. Please follow up with pt ed regarding HEP. Treatment/Activity Tolerance:  [x] Pt able to complete treatment [] Patient limited by fatique  [] Patient limited by pain  [] Patient limited by other medical complications  [] Other:     Prognosis: [x] Good [] Fair  [] Poor    Patient Requires Follow-up: [x] Yes  [] No    Return to Play:    [x]  N/A    PLAN: See eval. PT 1-2x / week for 4-6 weeks. Start 1x/week for 4 weeks @ 30 min sessions due to self pay   [x] Continue per plan of care [] Alter current plan (see comments)  [] Plan of care initiated [] Hold pending MD visit [] Discharge    Electronically signed by: Alicia Maldonado PT, DPT      Note: If patient does not return for scheduled/ recommended follow up visits, this note will serve as a discharge from care along with most recent update on progress.

## 2020-10-09 ENCOUNTER — HOSPITAL ENCOUNTER (OUTPATIENT)
Dept: PHYSICAL THERAPY | Age: 55
Setting detail: THERAPIES SERIES
Discharge: HOME | End: 2020-10-09

## 2020-10-09 PROCEDURE — 97140 MANUAL THERAPY 1/> REGIONS: CPT

## 2020-10-09 PROCEDURE — 97112 NEUROMUSCULAR REEDUCATION: CPT

## 2020-10-16 ENCOUNTER — HOSPITAL ENCOUNTER (OUTPATIENT)
Dept: PHYSICAL THERAPY | Age: 55
Setting detail: THERAPIES SERIES
Discharge: HOME | End: 2020-10-16

## 2020-10-16 PROCEDURE — 97112 NEUROMUSCULAR REEDUCATION: CPT

## 2020-10-16 PROCEDURE — 97110 THERAPEUTIC EXERCISES: CPT

## 2020-10-16 NOTE — FLOWSHEET NOTE
[address] [facility] Physical Therapy  Phone: [phone]   Fax: [phone]    Physical Therapy Treatment Note/ Progress Report:     Date:  10/16/2020    Patient Name:  Julieta Davis    :  1965  MRN: [id number]  Restrictions/Precautions:    Medical/Treatment Diagnosis Information:  Diagnosis: M00.9 (ICD-10-CM) - Septic arthritis of left ankle, due to unspecified organism [facility])  Treatment Diagnosis: L ankle hypomobility at talocrural, subtalar, and digits; decreased L ankle ROM, decreased L ankle strength, abnormal gait, decreased balance       Insurance/Certification information:  PT Insurance Information: Self Pay  Physician Information:  Referring Practitioner: Brandy Moncada MD  Plan of care signed (Y/N): []  Yes [x]  No     Date of Patient follow up with Physician: ALEJANDRA     Progress Report: [x]  Yes  []  No     Date Range for reporting period:  Beginnin2020  Ending:     Progress report due (10 Rx/or 30 days whichever is less): visit #10 or      Recertification due (POC duration/ or 90 days whichever is less): visit #12 or 10/16/2020 (6 weeks)     Visit # Insurance Allowable Auth required? Date Range   6 Self Pay []  Yes  [x]  No -     Latex Allergy:  [x]NO      []YES  Preferred Language for Healthcare:   [x]English       []other:    Functional Scale:        Date assessed:  LEFS: raw score = 52; dysfunction = 35%   2020    Pain level:  2-3/10     SUBJECTIVE: Pt reports she went walking with her daughter . Having some pain in the L thigh.      *phone  used for this session    OBJECTIVE: See eval      RESTRICTIONS/PRECAUTIONS: previous L ankle clean out for infection     Exercises/Interventions:     Therapeutic Exercise (14509)  Resistance / level Sets/sec Reps Notes / Cues   IB - Gastroc Stretch  30\" 2    IB - Soleus Stretch  30\" 2    Ankle Stretches        Ankle Circles        TB resisted DF  TB resisted PF  TB resisted Inv  TB resisted Evr Added 9/18    Added to HEP 9/25  Added to HEP 9/25   Foot Doming  Added 9/25/HEP                               Pt ed regarding appropriate performance (reps, frequency, foot wear) for HEP       Therapeutic Activities (16861)       Mini Squatting     Added 10/2   Pt ed regarding appropriate footwear       Step up to SLS  Added 10/2, resume npv          Neuromuscular Re-ed (25009)       BAPS A-P, M-L, CW, CCW 10/9   Tilt board A-P, M-L  1 10 each 10/9   Static balance on tilt board, A-P, M-L  30\" each 10/9   Tandem Stance     SLS  20\" 3 L    Semi tandem EC on airex  30\" 2 Added 10/2   Incline foam EC  20\" 2 each    BOSU step up  1/3\" 10         Semi tandem eyes closed  Added 9/25   Manual Intervention (32984)       Talocrural distraction and A-P glide     Subtalar distraction     MWM subtalar distraction with AROM DF     Great toe ext PROM, 1st ray PROM DF/Evr                              Pt. Education:  -pt educated on diagnosis, prognosis and expectations for rehab  -all pt questions were answered    Home Exercise Program:  Access Code: [email or web address]   URL: [email or web address]   Date: 09/04/2020   Prepared by: Coco Lopez into Paraguayan for patient   Exercises   Standing Heel Raise - 10 reps - 3 sets - 1x daily - 7x weekly   Standing Ankle Dorsiflexion with Table Support - 10 reps - 3 sets - 1x daily - 7x weekly   Mini Squat with Chair - 10 reps - 2 sets - 1x daily - 7x weekly   Gastroc Stretch on Wall - 3 reps - 20 hold - 1x daily - 7x weekly   Standing Gastroc Stretch on Step - 3 reps - 60s hold - 2-3x daily - 7x weekly   Standing Soleus Stretch on Step - 3 reps - 60s hold - 2-3x daily - 7x weekly   Seated Ankle Circles - 10 reps - 3 sets - 1x daily - 7x weekly   Seated Toe Flexion Extension PROM - 10 reps - 10s hold - 2x daily - 7x weekly     Access Code: [email or web address]   URL: [email or web address]   Date: 09/18/2020   Prepared by: Yuniel Weller into Paraguayan for patient  Exercises Tandem Stance - 4 reps - 1 sets - 30 hold - 1x daily - 7x weekly 10/16>>Semi tandem eyes closed    Access Code: [email or web address]   URL: [email or web address]   Date: 09/25/2020   Prepared by: Carlos Richmond into Nauruan for patient  Exercises   Seated Ankle Inversion with Resistance and Legs Crossed - 10 reps - 2 sets - 1x daily - 4x weekly   Ankle Eversion with Resistance - 10 reps - 2 sets - 1x daily - 4x weekly   Seated Arch Lifts - 10 reps - 2 sets - 5 hold - 1x daily - 4x weekly         Therapeutic Exercise and NMR EXR  [x] (11638) Provided verbal/tactile cueing for activities related to strengthening, flexibility, endurance, ROM for improvements in LE, proximal hip, and core control with self care, mobility, lifting, ambulation.  [] (75589) Provided verbal/tactile cueing for activities related to improving balance, coordination, kinesthetic sense, posture, motor skill, proprioception  to assist with LE, proximal hip, and core control in self care, mobility, lifting, ambulation and eccentric single leg control.   [] (89815) Therapist is in constant attendance of 2 or more patients providing skilled therapy interventions, but not providing any significant amount of measurable one-on-one time to either patient, for improvements in LE, proximal hip, and core control in self care, mobility, lifting, ambulation and eccentric single leg control.      NMR and Therapeutic Activities:    [x] (33821 or 27919) Provided verbal/tactile cueing for activities related to improving balance, coordination, kinesthetic sense, posture, motor skill, proprioception and motor activation to allow for proper function of core, proximal hip and LE with self care and ADLs  [] (51554) Gait Re-education- Provided training and instruction to the patient for proper LE, core and proximal hip recruitment and positioning and eccentric body weight control with ambulation re-education including up and down stairs     Home Exercise Program:    [x] (86673) Reviewed/Progressed HEP activities related to strengthening, flexibility, endurance, ROM of core, proximal hip and LE for functional self-care, mobility, lifting and ambulation/stair navigation   [] (18189)Reviewed/Progressed HEP activities related to improving balance, coordination, kinesthetic sense, posture, motor skill, proprioception of core, proximal hip and LE for self care, mobility, lifting, and ambulation/stair navigation      Manual Treatments:  PROM / STM / Oscillations-Mobs:  G-I, II, III, IV (PA's, Inf., Post.)  [x] (07821) Provided manual therapy to mobilize LE, proximal hip and/or LS spine soft tissue/joints for the purpose of modulating pain, promoting relaxation,  increasing ROM, reducing/eliminating soft tissue swelling/inflammation/restriction, improving soft tissue extensibility and allowing for proper ROM for normal function with self care, mobility, lifting and ambulation. Modalities:  [] (69173) Vasopneumatic compression: Utilized vasopneumatic compression to decrease edema / swelling for the purpose of improving mobility and quad tone / recruitment which will allow for increased overall function including but not limited to self-care, transfers, ambulation, and ascending / descending stairs. Modalities:      Charges:  Timed Code Treatment Minutes: 36   Total Treatment Minutes: 36     [] EVAL - LOW (20875)   [] EVAL - MOD (57268)  [] EVAL - HIGH (78798)  [] RE-EVAL (95680)  [x] DY(69465) x 1     [] Ionto  [x] NMR (74354) x  1   [] Vaso  [] Manual (58144) x    [] Ultrasound  [] TA x 1    [] Mech Traction (85143)  [] Aquatic Therapy x     [] ES (un) (15658):   [] Home Management Training x [] ES(attended) (15980)   [] Group:     [] Other:     GOALS:  Patient stated goal: improve ankle motion, decrease pain   []? Progressing: []? Met: []? Not Met: []? Adjusted     Therapist goals for Patient:   Short Term Goals: To be achieved in: 2 weeks  1.  Independent in HEP and progression per patient tolerance, in order to prevent re-injury. []? Progressing: []? Met: []? Not Met: []? Adjusted  2. Patient will have a decrease in pain to facilitate improvement in movement, function, and ADLs as indicated by Functional Deficits. []? Progressing: []? Met: []? Not Met: []? Adjusted     Long Term Goals: To be achieved in: 4-6 weeks  1. Disability index score of 25% or less for the LEFS to assist with reaching prior level of function. []? Progressing: []? Met: []? Not Met: []? Adjusted  2. Patient will demonstrate increased AROM DF to 15-20deg to allow for proper joint functioning as indicated by patients Functional Deficits. []? Progressing: []? Met: []? Not Met: []? Adjusted  3. Patient will demonstrate an increase in Strength to at least 4+/5 or greater as well as good proximal hip strength and control to allow for proper functional mobility as indicated by patients Functional Deficits. []? Progressing: []? Met: []? Not Met: []? Adjusted  4. Patient will return to functional activities including walking without increased symptoms or restriction. []? Progressing: []? Met: []? Not Met: []? Adjusted  5. Patient will return to functional activities including ability to navigate stairs with reciprocal pattern and 1 UE A without increased symptoms or restriction. []? Progressing: []? Met: []? Not Met: []? Adjusted      Overall Progression Towards Functional goals/ Treatment Progress Update:  [] Patient is progressing as expected towards functional goals listed. [] Progression is slowed due to complexities/Impairments listed. [] Progression has been slowed due to co-morbidities.   [x] Plan just implemented, too soon to assess goals progression <30days   [] Goals require adjustment due to lack of progress  [] Patient is not progressing as expected and requires additional follow up with physician  [] Other    Persisting Functional Limitations/Impairments:  []Sitting [x]Standing   [x]Walking [x]Stairs   [x]Transfers [x]ADLs   [x]Squatting/bending []Kneeling  [x]Housework []Job related tasks  []Driving []Sports/Recreation   []Sleeping []Other:    ASSESSMENT:  Progressed CK balance and strength this date with good tolerance. Pt presents with increased antalgic gait pattern at the end of the session. Is able to tolerate above interventions without increased symptoms. Continue to address jt mobility, strength, and balance at next visit. Focus on improved foot posture (decreased subtalar pronation) in CK. Please follow up with pt ed regarding HEP. Pt may DC next visit as she is cash pay. Treatment/Activity Tolerance:  [x] Pt able to complete treatment [] Patient limited by fatique  [] Patient limited by pain  [] Patient limited by other medical complications  [] Other:     Prognosis: [x] Good [] Fair  [] Poor    Patient Requires Follow-up: [x] Yes  [] No    Return to Play:    [x]  N/A    PLAN: See eval. PT 1-2x / week for 4-6 weeks. Start 1x/week for 4 weeks @ 30 min sessions due to self pay   [x] Continue per plan of care [] Alter current plan (see comments)  [] Plan of care initiated [] Hold pending MD visit [] Discharge    Electronically signed by: Shelia Barroso PT, DPT      Note: If patient does not return for scheduled/ recommended follow up visits, this note will serve as a discharge from care along with most recent update on progress.

## 2020-10-23 ENCOUNTER — HOSPITAL ENCOUNTER (OUTPATIENT)
Dept: PHYSICAL THERAPY | Age: 55
Setting detail: THERAPIES SERIES
Discharge: HOME | End: 2020-10-23

## 2020-10-23 PROCEDURE — 97112 NEUROMUSCULAR REEDUCATION: CPT

## 2020-10-23 NOTE — FLOWSHEET NOTE
[address] [facility] Physical Therapy  Phone: [phone]   Fax: [phone]    Physical Therapy Treatment Note/ Progress Report:     Date:  10/23/2020    Patient Name:  Ellie Lee    :  1965  MRN: [id number]  Restrictions/Precautions:    Medical/Treatment Diagnosis Information:  Diagnosis: M00.9 (ICD-10-CM) - Septic arthritis of left ankle, due to unspecified organism [facility])  Treatment Diagnosis: L ankle hypomobility at talocrural, subtalar, and digits; decreased L ankle ROM, decreased L ankle strength, abnormal gait, decreased balance       Insurance/Certification information:  PT Insurance Information: Self Pay  Physician Information:  Referring Practitioner: Camila Virgen MD  Plan of care signed (Y/N): []  Yes [x]  No     Date of Patient follow up with Physician: ALEJANDRA     Progress Report: [x]  Yes  []  No     Date Range for reporting period:  Beginnin2020  Ending:     Progress report due (10 Rx/or 30 days whichever is less): visit #10 or 1053     Recertification due (POC duration/ or 90 days whichever is less): visit #12 or 10/16/2020 (6 weeks)     Visit # Insurance Allowable Auth required? Date Range   7 Self Pay []  Yes  [x]  No -     Latex Allergy:  [x]NO      []YES  Preferred Language for Healthcare:   [x]English       []other:    Functional Scale:        Date assessed:  LEFS: raw score = 52; dysfunction = 35%   2020    Pain level:  2-3/10     SUBJECTIVE: Pt reports she is feeling better than she did last week. She is doing all of her exercises at home. Pt would like to continue with her prescribed visits. She plans to apply for financial assistance. She would like to schedule more visits this date.      *phone  used for this session    OBJECTIVE: See eval      RESTRICTIONS/PRECAUTIONS: previous L ankle clean out for infection     Exercises/Interventions:     Therapeutic Exercise (26899)  Resistance / level Sets/sec Reps Notes / Cues   IB - Gastroc Stretch  30\" 2    IB - Soleus Stretch     Heel raises  2 10    Ankle Stretches        Ankle Circles        TB resisted DF  TB resisted PF  TB resisted Inv  TB resisted Evr Added 9/18    Added to HEP 9/25  Added to HEP 9/25   Foot Doming  Added 9/25/HEP                               Pt ed regarding appropriate performance (reps, frequency, foot wear) for HEP       Therapeutic Activities (58263)       Mini Squatting     Added 10/2   Pt ed regarding appropriate footwear       Step up to SLS  Added 10/2, resume npv          Step down       Step up       Neuromuscular Re-ed (12849)       BAPS A-P, M-L, CW, CCW 10/9   Tilt board A-P, M-L  1 10 each 10/9   Static balance on tilt board, A-P, M-L  30\" each 10/9   Tandem Stance     SLS  20\" 3 L    Semi tandem EC on airex  30\" 2 Added 10/2   Incline foam EC  30\" 2 each    BOSU step up  1/3\" 10    Standing foot ankle posture  10 min10/23 mirror for visual cueing   Semi tandem eyes closed  Added 9/25   Manual Intervention (14840)       Talocrural distraction and A-P glide     Subtalar distraction     MWM subtalar distraction with AROM DF     Great toe ext PROM, 1st ray PROM DF/Evr                              Pt. Education:  -pt educated on diagnosis, prognosis and expectations for rehab  -all pt questions were answered    Home Exercise Program:  Access Code: [email or web address]   URL: [email or web address]   Date: 09/04/2020   Prepared by: Mick Butler into Malian for patient   Exercises   Standing Heel Raise - 10 reps - 3 sets - 1x daily - 7x weekly   Standing Ankle Dorsiflexion with Table Support - 10 reps - 3 sets - 1x daily - 7x weekly   Mini Squat with Chair - 10 reps - 2 sets - 1x daily - 7x weekly   Gastroc Stretch on Wall - 3 reps - 20 hold - 1x daily - 7x weekly   Standing Gastroc Stretch on Step - 3 reps - 60s hold - 2-3x daily - 7x weekly   Standing Soleus Stretch on Step - 3 reps - 60s hold - 2-3x daily - 7x weekly   Seated Ankle Circles - 10 reps - 3 sets ADLs  [] (17400) Gait Re-education- Provided training and instruction to the patient for proper LE, core and proximal hip recruitment and positioning and eccentric body weight control with ambulation re-education including up and down stairs     Home Exercise Program:    [x] (01258) Reviewed/Progressed HEP activities related to strengthening, flexibility, endurance, ROM of core, proximal hip and LE for functional self-care, mobility, lifting and ambulation/stair navigation   [] (26572)Reviewed/Progressed HEP activities related to improving balance, coordination, kinesthetic sense, posture, motor skill, proprioception of core, proximal hip and LE for self care, mobility, lifting, and ambulation/stair navigation      Manual Treatments:  PROM / STM / Oscillations-Mobs:  G-I, II, III, IV (PA's, Inf., Post.)  [x] (21595) Provided manual therapy to mobilize LE, proximal hip and/or LS spine soft tissue/joints for the purpose of modulating pain, promoting relaxation,  increasing ROM, reducing/eliminating soft tissue swelling/inflammation/restriction, improving soft tissue extensibility and allowing for proper ROM for normal function with self care, mobility, lifting and ambulation. Modalities:  [] (42339) Vasopneumatic compression: Utilized vasopneumatic compression to decrease edema / swelling for the purpose of improving mobility and quad tone / recruitment which will allow for increased overall function including but not limited to self-care, transfers, ambulation, and ascending / descending stairs.        Modalities:      Charges:  Timed Code Treatment Minutes: 37   Total Treatment Minutes: 37     [] EVAL - LOW (40156)   [] EVAL - MOD (51937)  [] EVAL - HIGH (53497)  [] RE-EVAL (75933)  [] JE(65006) x      [] Ionto  [x] NMR (58696) x  2   [] Vaso  [] Manual (82809) x    [] Ultrasound  [] TA x     [] Mech Traction (44808)  [] Aquatic Therapy x     [] ES (un) (93506):   [] Home Management Training x [] ES(attended)

## 2020-10-30 ENCOUNTER — HOSPITAL ENCOUNTER (OUTPATIENT)
Dept: PHYSICAL THERAPY | Age: 55
Setting detail: THERAPIES SERIES
Discharge: HOME | End: 2020-10-30

## 2020-10-30 PROCEDURE — 97112 NEUROMUSCULAR REEDUCATION: CPT

## 2020-10-30 PROCEDURE — 97530 THERAPEUTIC ACTIVITIES: CPT

## 2020-10-30 NOTE — FLOWSHEET NOTE
[address] [facility] Therapy  Phone: [phone]   Fax: [phone]    Physical Therapy Treatment Note/ Progress Report:     Date:  10/30/2020    Patient Name:  Gama Eubanks    :  1965  MRN: [id number]  Restrictions/Precautions:    Medical/Treatment Diagnosis Information:  Diagnosis: M00.9 (ICD-10-CM) - Septic arthritis of left ankle, due to unspecified organism [facility])  Treatment Diagnosis: L ankle hypomobility at talocrural, subtalar, and digits; decreased L ankle ROM, decreased L ankle strength, abnormal gait, decreased balance       Insurance/Certification information:  PT Insurance Information: Self Pay  Physician Information:  Referring Practitioner: Kory Hallman MD  Plan of care signed (Y/N): []  Yes [x]  No     Date of Patient follow up with Physician: ALEJANDRA     Progress Report: [x]  Yes  []  No     Date Range for reporting period:  Beginnin2020  Ending:     Progress report due (10 Rx/or 30 days whichever is less): visit #18 or      Recertification due (POC duration/ or 90 days whichever is less): visit #12 or 10/16/2020 (6 weeks)     Visit # Insurance Allowable Auth required? Date Range   8 Self Pay []  Yes  [x]  No -     Latex Allergy:  [x]NO      []YES  Preferred Language for Healthcare:   [x]English       []other:    Functional Scale:        Date assessed:  LEFS: raw score = 57; dysfunction = 28.75%   10/30/2020    Pain level:  1/10     SUBJECTIVE: Pt reports she is feeling better than she did last week.  Continues to struggle with stair descent and walking over 15 min    *phone  used for this session    OBJECTIVE:   Palpation: TPP not present at ankle, reports global pain     Functional Mobility/Transfers: decreased L ankle mobility with squats, sit<>stand transitions; performs independently      Posture: decreased arches B (L > R), L calcaneal eversion with pronation      Bandages/Dressings/Incisions: n/a     Gait: independent w/o AD; decreased stance time on R LE, shorted step length on L LE, midfoot initial contact on L with early heel rise at terminal stance on L      Dermatomes Normal Abnormal Comments   inguinal area (L1)          anterior mid-thigh (L2) x       distal ant thigh/med knee (L3) x       medial lower leg and foot (L4) x       lateral lower leg and foot (L5) x       posterior calf (S1) x       medial calcaneus (S2) x                      PROM AROM     L R L R   Dorsiflexion      14 20   Plantarflexion      34 45   Inversion      20 35   Eversion      20 20         Strength (0-5) / Myotomes Left Right   Ankle Dorsiflexion (L4-5) 5 5   Ankle Plantarflexion (S1-2) 4 4   Ankle Inversion 4+ 5   Ankle Eversion (S1-2) 4+ 5   Great Toe Extension (L5)                  Joint mobility: L talocrual               []?Normal                      [x]? Hypo              []?Hyper     Orthopaedic Special Tests Positive  Negative  NT Comments    Ankle           Anterior Drawer   x       Talar Tilt   x       Vyas   x       Brock's    x                      Balance: SLS on R = 20s; SLS on L = 14s; fair balance overall, decrease motion with limited ankle strategy available on L         RESTRICTIONS/PRECAUTIONS: previous L ankle clean out for infection     Exercises/Interventions:     Therapeutic Exercise (69341)  Resistance / level Sets/sec Reps Notes / Cues   IB - Gastroc Stretch  30\" 2    IB - Soleus Stretch     Heel raises  2 10    Ankle Stretches        Ankle Circles        TB resisted DF  TB resisted PF  TB resisted Inv  TB resisted Evr Added 9/18    Added to HEP 9/25  Added to HEP 9/25   Foot Doming  Added 9/25/HEP                               Pt ed regarding appropriate performance (reps, frequency, foot wear) for HEP       Therapeutic Activities (12765)       Mini Squatting     Added 10/2   Pt ed regarding appropriate footwear       Step up to SLS  Added 10/2, resume npv          Step down 4\">>2\" 2 10    Step up       Neuromuscular Re-ed sets - 1x daily - 4x weekly   Ankle Eversion with Resistance - 10 reps - 2 sets - 1x daily - 4x weekly   Seated Arch Lifts - 10 reps - 2 sets - 5 hold - 1x daily - 4x weekly         Therapeutic Exercise and NMR EXR  [x] (36970) Provided verbal/tactile cueing for activities related to strengthening, flexibility, endurance, ROM for improvements in LE, proximal hip, and core control with self care, mobility, lifting, ambulation.  [] (12133) Provided verbal/tactile cueing for activities related to improving balance, coordination, kinesthetic sense, posture, motor skill, proprioception  to assist with LE, proximal hip, and core control in self care, mobility, lifting, ambulation and eccentric single leg control.   [] (58640) Therapist is in constant attendance of 2 or more patients providing skilled therapy interventions, but not providing any significant amount of measurable one-on-one time to either patient, for improvements in LE, proximal hip, and core control in self care, mobility, lifting, ambulation and eccentric single leg control.      NMR and Therapeutic Activities:    [x] (18588 or 85076) Provided verbal/tactile cueing for activities related to improving balance, coordination, kinesthetic sense, posture, motor skill, proprioception and motor activation to allow for proper function of core, proximal hip and LE with self care and ADLs  [] (98716) Gait Re-education- Provided training and instruction to the patient for proper LE, core and proximal hip recruitment and positioning and eccentric body weight control with ambulation re-education including up and down stairs     Home Exercise Program:    [x] (34600) Reviewed/Progressed HEP activities related to strengthening, flexibility, endurance, ROM of core, proximal hip and LE for functional self-care, mobility, lifting and ambulation/stair navigation   [] (01456)Reviewed/Progressed HEP activities related to improving balance, coordination, kinesthetic sense, Goals: To be achieved in: 4-6 weeks  1. Disability index score of 25% or less for the LEFS to assist with reaching prior level of function. [x]? Progressing: []? Met: []? Not Met: []? Adjusted  2. Patient will demonstrate increased AROM DF to 15-20deg to allow for proper joint functioning as indicated by patients Functional Deficits. [x]? Progressing: []? Met: []? Not Met: []? Adjusted  3. Patient will demonstrate an increase in Strength to at least 4+/5 or greater as well as good proximal hip strength and control to allow for proper functional mobility as indicated by patients Functional Deficits. [x]? Progressing: []? Met: []? Not Met: []? Adjusted  4. Patient will return to functional activities including walking without increased symptoms or restriction. [x]? Progressing: []? Met: []? Not Met: []? Adjusted  5. Patient will return to functional activities including ability to navigate stairs with reciprocal pattern and 1 UE A without increased symptoms or restriction. [x]? Progressing: []? Met: []? Not Met: []? Adjusted      Overall Progression Towards Functional goals/ Treatment Progress Update:  [] Patient is progressing as expected towards functional goals listed. [] Progression is slowed due to complexities/Impairments listed. [] Progression has been slowed due to co-morbidities. [x] Plan just implemented, too soon to assess goals progression <30days   [] Goals require adjustment due to lack of progress  [] Patient is not progressing as expected and requires additional follow up with physician  [] Other    Persisting Functional Limitations/Impairments:  []Sitting [x]Standing   [x]Walking [x]Stairs   [x]Transfers [x]ADLs   [x]Squatting/bending []Kneeling  [x]Housework []Job related tasks  []Driving []Sports/Recreation   []Sleeping []Other:    ASSESSMENT: Pt is a 49 yo female referred to PT for L ankle pain.  Pt had arthroscopic surgery Oct 2019 in which she was diagnosed with septic arthritis of left ankle. She has been seen for 8 visits in PT. She presents with improved posture, balance, gait pattern, gross ROM, and gross L ankle strength. She continues to present with deficits in muscle endurance, balance, posture, gait pattern, and strength. She will benefit from continued skilled therapy to address these deficits, achieve goals, and progress to PLOF. Treatment/Activity Tolerance:  [x] Pt able to complete treatment [] Patient limited by fatique  [] Patient limited by pain  [] Patient limited by other medical complications  [] Other:     Prognosis: [x] Good [] Fair  [] Poor    Patient Requires Follow-up: [x] Yes  [] No    Return to Play:    [x]  N/A    PLAN: See eval. PT 1-2x / week for 4-6 weeks. Start 1x/week for 4 weeks @ 30 min sessions due to self pay   [x] Continue per plan of care [] Alter current plan (see comments)  [] Plan of care initiated [] Hold pending MD visit [] Discharge    Electronically signed by: Shade Torres PT, DPT      Note: If patient does not return for scheduled/ recommended follow up visits, this note will serve as a discharge from care along with most recent update on progress.

## 2020-11-06 ENCOUNTER — HOSPITAL ENCOUNTER (OUTPATIENT)
Dept: PHYSICAL THERAPY | Age: 55
Setting detail: THERAPIES SERIES
Discharge: HOME | End: 2020-11-06

## 2020-11-06 PROCEDURE — 97530 THERAPEUTIC ACTIVITIES: CPT

## 2020-11-06 PROCEDURE — 97112 NEUROMUSCULAR REEDUCATION: CPT

## 2020-11-06 NOTE — FLOWSHEET NOTE
[address] [facility] Therapy  Phone: [phone]   Fax: [phone]    Physical Therapy Treatment Note/ Progress Report:     Date:  2020    Patient Name:  Reggie Mendoza    :  1965  MRN: [id number]  Restrictions/Precautions:    Medical/Treatment Diagnosis Information:  Diagnosis: M00.9 (ICD-10-CM) - Septic arthritis of left ankle, due to unspecified organism [facility])  Treatment Diagnosis: L ankle hypomobility at talocrural, subtalar, and digits; decreased L ankle ROM, decreased L ankle strength, abnormal gait, decreased balance       Insurance/Certification information:  PT Insurance Information: Self Pay  Physician Information:  Referring Practitioner: Chester Chavez MD  Plan of care signed (Y/N): []  Yes [x]  No     Date of Patient follow up with Physician: ALEJANDRA     Progress Report: [x]  Yes  []  No     Date Range for reporting period:  Beginnin2020  Ending:     Progress report due (10 Rx/or 30 days whichever is less): visit #18 or      Recertification due (POC duration/ or 90 days whichever is less): visit #12 or 10/16/2020 (6 weeks)     Visit # Insurance Allowable Auth required? Date Range   9 Self Pay []  Yes  [x]  No -     Latex Allergy:  [x]NO      []YES  Preferred Language for Healthcare:   [x]English       []other:    Functional Scale:        Date assessed:  LEFS: raw score = 57; dysfunction = 28.75%   10/30/2020    Pain level:  1/10     SUBJECTIVE: Pt reports she is feeling better than she did last week.  Continues to struggle with stair descent and walking over 15 min    *phone  used for this session    OBJECTIVE:   Palpation: TPP not present at ankle, reports global pain     Functional Mobility/Transfers: decreased L ankle mobility with squats, sit<>stand transitions; performs independently      Posture: decreased arches B (L > R), L calcaneal eversion with pronation      Bandages/Dressings/Incisions: n/a     Gait: independent w/o AD; decreased stance time on R LE, shorted step length on L LE, midfoot initial contact on L with early heel rise at terminal stance on L      Dermatomes Normal Abnormal Comments   inguinal area (L1)          anterior mid-thigh (L2) x       distal ant thigh/med knee (L3) x       medial lower leg and foot (L4) x       lateral lower leg and foot (L5) x       posterior calf (S1) x       medial calcaneus (S2) x                      PROM AROM     L R L R   Dorsiflexion      14 20   Plantarflexion      34 45   Inversion      20 35   Eversion      20 20         Strength (0-5) / Myotomes Left Right   Ankle Dorsiflexion (L4-5) 5 5   Ankle Plantarflexion (S1-2) 4 4   Ankle Inversion 4+ 5   Ankle Eversion (S1-2) 4+ 5   Great Toe Extension (L5)                  Joint mobility: L talocrual               []?Normal                      [x]? Hypo              []?Hyper     Orthopaedic Special Tests Positive  Negative  NT Comments    Ankle           Anterior Drawer   x       Talar Tilt   x       Vyas   x       Brock's    x                      Balance: SLS on R = 20s; SLS on L = 14s; fair balance overall, decrease motion with limited ankle strategy available on L         RESTRICTIONS/PRECAUTIONS: previous L ankle clean out for infection     Exercises/Interventions:     Therapeutic Exercise (74916)  Resistance / level Sets/sec Reps Notes / Cues   IB - Gastroc Stretch  30\" 2    IB - Soleus Stretch  RESUME NPV   Heel raises  2 10    Ankle Stretches        Ankle Circles        TB resisted DF  TB resisted PF  TB resisted Inv  TB resisted Evr Added 9/18    Added to HEP 9/25  Added to HEP 9/25   Foot Doming  Added 9/25/HEP                               Pt ed regarding appropriate performance (reps, frequency, foot wear) for HEP       Therapeutic Activities (60499)       Mini Squatting     Added 10/2   Pt ed regarding appropriate footwear       Step up to SLS  Added 10/2, resume npv   Squat   10 Added 11/6   Step down 2\" 2 10    Lateral step (17692)Reviewed/Progressed HEP activities related to improving balance, coordination, kinesthetic sense, posture, motor skill, proprioception of core, proximal hip and LE for self care, mobility, lifting, and ambulation/stair navigation      Manual Treatments:  PROM / STM / Oscillations-Mobs:  G-I, II, III, IV (PA's, Inf., Post.)  [x] (27383) Provided manual therapy to mobilize LE, proximal hip and/or LS spine soft tissue/joints for the purpose of modulating pain, promoting relaxation,  increasing ROM, reducing/eliminating soft tissue swelling/inflammation/restriction, improving soft tissue extensibility and allowing for proper ROM for normal function with self care, mobility, lifting and ambulation. Modalities:  [] (18139) Vasopneumatic compression: Utilized vasopneumatic compression to decrease edema / swelling for the purpose of improving mobility and quad tone / recruitment which will allow for increased overall function including but not limited to self-care, transfers, ambulation, and ascending / descending stairs. Modalities:      Charges:  Timed Code Treatment Minutes: 36   Total Treatment Minutes: 36     [] EVAL - LOW (80245)   [] EVAL - MOD (74075)  [] EVAL - HIGH (94730)  [] RE-EVAL (53415)  [] IK(30007) x      [] Ionto  [x] NMR (05497) x  1   [] Vaso  [] Manual (99001) x    [] Ultrasound  [x] TA x 1    [] Mech Traction (49233)  [] Aquatic Therapy x     [] ES (un) (14987):   [] Home Management Training x [] ES(attended) (68142)   [] Group:     [] Other:     GOALS:  Patient stated goal: improve ankle motion, decrease pain   []? Progressing: []? Met: []? Not Met: []? Adjusted     Therapist goals for Patient:   Short Term Goals: To be achieved in: 2 weeks  1. Independent in HEP and progression per patient tolerance, in order to prevent re-injury. []? Progressing: [x]? Met: []? Not Met: []? Adjusted  2.  Patient will have a decrease in pain to facilitate improvement in movement, function, and ADLs symptoms. Is better able to maintain neutral foot posture in CKC this date. Continues to present with rear foot hypomobility this date as observed with squatting. Presents with improved tolerance, strength, and body mechanics with anterior step down this date. Continue to address foot/ankle posture in CK, functional strength, and balance. Treatment/Activity Tolerance:  [x] Pt able to complete treatment [] Patient limited by fatique  [] Patient limited by pain  [] Patient limited by other medical complications  [] Other:     Prognosis: [x] Good [] Fair  [] Poor    Patient Requires Follow-up: [x] Yes  [] No    Return to Play:    [x]  N/A    PLAN: See eval. PT 1-2x / week for 4-6 weeks. Start 1x/week for 4 weeks @ 30 min sessions due to self pay   [x] Continue per plan of care [] Alter current plan (see comments)  [] Plan of care initiated [] Hold pending MD visit [] Discharge    Electronically signed by: Margie Almaguer PT, DPT      Note: If patient does not return for scheduled/ recommended follow up visits, this note will serve as a discharge from care along with most recent update on progress.

## 2020-11-13 ENCOUNTER — HOSPITAL ENCOUNTER (OUTPATIENT)
Dept: PHYSICAL THERAPY | Age: 55
Setting detail: THERAPIES SERIES
Discharge: HOME | End: 2020-11-13

## 2020-11-13 PROCEDURE — 97140 MANUAL THERAPY 1/> REGIONS: CPT

## 2020-11-13 PROCEDURE — 97112 NEUROMUSCULAR REEDUCATION: CPT

## 2020-11-13 NOTE — FLOWSHEET NOTE
[address] [facility] Physical Therapy  Phone: [phone]   Fax: [phone]    Physical Therapy Treatment Note/ Progress Report:     Date:  2020    Patient Name:  Cheyanne Aly    :  1965  MRN: [id number]  Restrictions/Precautions:    Medical/Treatment Diagnosis Information:  Diagnosis: M00.9 (ICD-10-CM) - Septic arthritis of left ankle, due to unspecified organism [facility])  Treatment Diagnosis: L ankle hypomobility at talocrural, subtalar, and digits; decreased L ankle ROM, decreased L ankle strength, abnormal gait, decreased balance       Insurance/Certification information:  PT Insurance Information: Self Pay  Physician Information:  Referring Practitioner: Marylou Conrad MD  Plan of care signed (Y/N): []  Yes [x]  No     Date of Patient follow up with Physician: ALEJANDRA     Progress Report: []  Yes  [x]  No     Date Range for reporting period:  Beginnin2020  Ending:     Progress report due (10 Rx/or 30 days whichever is less): visit #18 or      Recertification due (POC duration/ or 90 days whichever is less): visit #12 or 10/16/2020 (6 weeks)     Visit # Insurance Allowable Auth required? Date Range   10 Self Pay []  Yes  [x]  No -     Latex Allergy:  [x]NO      []YES  Preferred Language for Healthcare:   [x]English       []other:    Functional Scale:        Date assessed:  LEFS: raw score = 57; dysfunction = 28.75%   10/30/2020    Pain level:  2/10     SUBJECTIVE: Pt reports she has had slight increase in pain since the last visit.      *phone  used for this session    OBJECTIVE:   Palpation: TPP not present at ankle, reports global pain     Functional Mobility/Transfers: decreased L ankle mobility with squats, sit<>stand transitions; performs independently      Posture: decreased arches B (L > R), L calcaneal eversion with pronation      Bandages/Dressings/Incisions: n/a     Gait: independent w/o AD; decreased stance time on R LE, shorted step length on L LE, midfoot initial contact on L with early heel rise at terminal stance on L      Dermatomes Normal Abnormal Comments   inguinal area (L1)          anterior mid-thigh (L2) x       distal ant thigh/med knee (L3) x       medial lower leg and foot (L4) x       lateral lower leg and foot (L5) x       posterior calf (S1) x       medial calcaneus (S2) x                      PROM AROM     L R L R   Dorsiflexion      14 20   Plantarflexion      34 45   Inversion      20 35   Eversion      20 20         Strength (0-5) / Myotomes Left Right   Ankle Dorsiflexion (L4-5) 5 5   Ankle Plantarflexion (S1-2) 4 4   Ankle Inversion 4+ 5   Ankle Eversion (S1-2) 4+ 5   Great Toe Extension (L5)                  Joint mobility: L talocrual               []?Normal                      [x]? Hypo              []?Hyper     Orthopaedic Special Tests Positive  Negative  NT Comments    Ankle           Anterior Drawer   x       Talar Tilt   x       Vyas   x       Brock's    x                      Balance: SLS on R = 20s; SLS on L = 14s; fair balance overall, decrease motion with limited ankle strategy available on L         RESTRICTIONS/PRECAUTIONS: previous L ankle clean out for infection     Exercises/Interventions:     Therapeutic Exercise (14313)  Resistance / level Sets/sec Reps Notes / Cues   IB - Gastroc Stretch  30\" 2    IB - Soleus Stretch  RESUME NPV   Heel raises  2 10    Ankle Stretches        Ankle Circles        TB resisted DF  TB resisted PF  TB resisted Inv  TB resisted Evr Blue  Blue  Lime  Lime2  2  2  2 10  10  10  10 Added 9/18    Added to HEP 9/25  Added to HEP 9/25   Foot Doming  Added 9/25/HEP                               Pt ed regarding appropriate performance (reps, frequency, foot wear) for HEP       Therapeutic Activities (45535)       Mini Squatting     Added 10/2   Pt ed regarding appropriate footwear       Step up to SLS  Added 10/2, resume npv   Squat    Added 11/6   Step down    Lateral step down Added 116 Neuromuscular Re-ed (45141)       BAPS A-P, M-L, CW, CCW 10/9   Tilt board A-P, M-L  1 10 each 10/9   Static balance on tilt board, A-P, M-L  30\" each 10/9   Tandem Stance     SLS  30\" 3 L    Semi tandem EC on airex  30\" 2 Added 10/2   Incline foam EC     BOSU step up frwd and lat  Added lateral 11/6   Standing foot ankle posture  10/23 mirror for visual cueing   Semi tandem eyes closed  Added 9/25   Manual Intervention (36275)       Talocrural distraction and A-P glide  4 min    Subtalar distraction  4 min    MWM subtalar distraction with AROM DF     Great toe ext PROM, 1st ray PROM DF/Evr  3 min                             Pt. Education:  -pt educated on diagnosis, prognosis and expectations for rehab  -all pt questions were answered    Home Exercise Program:  Access Code: [email or web address]   URL: [email or web address]   Date: 09/04/2020   Prepared by: Sigrid Soares into Ivorian for patient   Exercises   Standing Heel Raise - 10 reps - 3 sets - 1x daily - 7x weekly   Standing Ankle Dorsiflexion with Table Support - 10 reps - 3 sets - 1x daily - 7x weekly   Mini Squat with Chair - 10 reps - 2 sets - 1x daily - 7x weekly   Gastroc Stretch on Wall - 3 reps - 20 hold - 1x daily - 7x weekly   Standing Gastroc Stretch on Step - 3 reps - 60s hold - 2-3x daily - 7x weekly   Standing Soleus Stretch on Step - 3 reps - 60s hold - 2-3x daily - 7x weekly   Seated Ankle Circles - 10 reps - 3 sets - 1x daily - 7x weekly   Seated Toe Flexion Extension PROM - 10 reps - 10s hold - 2x daily - 7x weekly     Access Code: [email or web address]   URL: [email or web address]   Date: 09/18/2020   Prepared by: Jerzy Nicholas into Ivorian for patient  Exercises   Tandem Stance - 4 reps - 1 sets - 30 hold - 1x daily - 7x weekly 10/16>>Semi tandem eyes closed    Access Code: [email or web address]   URL: [email or web address]   Date: 09/25/2020   Prepared by: Jerzy Nicholas into Ivorian for patient  Exercises   Seated Ankle Inversion with Resistance and Legs Crossed - 10 reps - 2 sets - 1x daily - 4x weekly   Ankle Eversion with Resistance - 10 reps - 2 sets - 1x daily - 4x weekly   Seated Arch Lifts - 10 reps - 2 sets - 5 hold - 1x daily - 4x weekly         Therapeutic Exercise and NMR EXR  [x] (65139) Provided verbal/tactile cueing for activities related to strengthening, flexibility, endurance, ROM for improvements in LE, proximal hip, and core control with self care, mobility, lifting, ambulation.  [] (91582) Provided verbal/tactile cueing for activities related to improving balance, coordination, kinesthetic sense, posture, motor skill, proprioception  to assist with LE, proximal hip, and core control in self care, mobility, lifting, ambulation and eccentric single leg control.   [] (10136) Therapist is in constant attendance of 2 or more patients providing skilled therapy interventions, but not providing any significant amount of measurable one-on-one time to either patient, for improvements in LE, proximal hip, and core control in self care, mobility, lifting, ambulation and eccentric single leg control.      NMR and Therapeutic Activities:    [x] (09839 or 91144) Provided verbal/tactile cueing for activities related to improving balance, coordination, kinesthetic sense, posture, motor skill, proprioception and motor activation to allow for proper function of core, proximal hip and LE with self care and ADLs  [] (82415) Gait Re-education- Provided training and instruction to the patient for proper LE, core and proximal hip recruitment and positioning and eccentric body weight control with ambulation re-education including up and down stairs     Home Exercise Program:    [x] (90546) Reviewed/Progressed HEP activities related to strengthening, flexibility, endurance, ROM of core, proximal hip and LE for functional self-care, mobility, lifting and ambulation/stair navigation   [] (11207)Reviewed/Progressed HEP activities related to improving balance, coordination, kinesthetic sense, posture, motor skill, proprioception of core, proximal hip and LE for self care, mobility, lifting, and ambulation/stair navigation      Manual Treatments:  PROM / STM / Oscillations-Mobs:  G-I, II, III, IV (PA's, Inf., Post.)  [x] (71846) Provided manual therapy to mobilize LE, proximal hip and/or LS spine soft tissue/joints for the purpose of modulating pain, promoting relaxation,  increasing ROM, reducing/eliminating soft tissue swelling/inflammation/restriction, improving soft tissue extensibility and allowing for proper ROM for normal function with self care, mobility, lifting and ambulation. Modalities:  [] (78566) Vasopneumatic compression: Utilized vasopneumatic compression to decrease edema / swelling for the purpose of improving mobility and quad tone / recruitment which will allow for increased overall function including but not limited to self-care, transfers, ambulation, and ascending / descending stairs. Modalities:      Charges:  Timed Code Treatment Minutes: 35   Total Treatment Minutes: 35     [] EVAL - LOW (06305)   [] EVAL - MOD (81466)  [] EVAL - HIGH (66758)  [] RE-EVAL (24013)  [] VQ(46750) x      [] Ionto  [x] NMR (20335) x  1   [] Vaso  [x] Manual (96973) x 1   [] Ultrasound  [] TA x     [] Mech Traction (07231)  [] Aquatic Therapy x     [] ES (un) (81709):   [] Home Management Training x [] ES(attended) (98062)   [] Group:     [] Other:     GOALS:  Patient stated goal: improve ankle motion, decrease pain   []? Progressing: []? Met: []? Not Met: []? Adjusted     Therapist goals for Patient:   Short Term Goals: To be achieved in: 2 weeks  1. Independent in HEP and progression per patient tolerance, in order to prevent re-injury. []? Progressing: [x]? Met: []? Not Met: []? Adjusted  2. Patient will have a decrease in pain to facilitate improvement in movement, function, and ADLs as indicated by Functional Deficits.   []? Progressing: [x]? Met: []? Not Met: []? Adjusted     Long Term Goals: To be achieved in: 4-6 weeks  1. Disability index score of 25% or less for the LEFS to assist with reaching prior level of function. [x]? Progressing: []? Met: []? Not Met: []? Adjusted  2. Patient will demonstrate increased AROM DF to 15-20deg to allow for proper joint functioning as indicated by patients Functional Deficits. [x]? Progressing: []? Met: []? Not Met: []? Adjusted  3. Patient will demonstrate an increase in Strength to at least 4+/5 or greater as well as good proximal hip strength and control to allow for proper functional mobility as indicated by patients Functional Deficits. [x]? Progressing: []? Met: []? Not Met: []? Adjusted  4. Patient will return to functional activities including walking without increased symptoms or restriction. [x]? Progressing: []? Met: []? Not Met: []? Adjusted  5. Patient will return to functional activities including ability to navigate stairs with reciprocal pattern and 1 UE A without increased symptoms or restriction. [x]? Progressing: []? Met: []? Not Met: []? Adjusted      Overall Progression Towards Functional goals/ Treatment Progress Update:  [] Patient is progressing as expected towards functional goals listed. [] Progression is slowed due to complexities/Impairments listed. [] Progression has been slowed due to co-morbidities. [x] Plan just implemented, too soon to assess goals progression <30days   [] Goals require adjustment due to lack of progress  [] Patient is not progressing as expected and requires additional follow up with physician  [] Other    Persisting Functional Limitations/Impairments:  []Sitting [x]Standing   [x]Walking [x]Stairs   [x]Transfers [x]ADLs   [x]Squatting/bending []Kneeling  [x]Housework []Job related tasks  []Driving []Sports/Recreation   []Sleeping []Other:    ASSESSMENT: Per pt report she has had increased pain to 2/10 since the last visit.  Likely due to increased CKC strengthening with squatting and step downs last patient visit. Held these activities this date with good tolerance of the session. Requested pt also hold step downs at home and hold gastroc stretching at the stairs for the next few days. Also recommended ice for pain management at home. Pt demonstrates understanding and is agreeable. Please follow up with the response to changing the HEP at the next visit. Continue to address foot/ankle posture in CKC, functional strength, and balance. Treatment/Activity Tolerance:  [x] Pt able to complete treatment [] Patient limited by fatique  [] Patient limited by pain  [] Patient limited by other medical complications  [] Other:     Prognosis: [x] Good [] Fair  [] Poor    Patient Requires Follow-up: [x] Yes  [] No    Return to Play:    [x]  N/A    PLAN: See eval. PT 1-2x / week for 4-6 weeks. Start 1x/week for 4 weeks @ 30 min sessions due to self pay   [x] Continue per plan of care [] Alter current plan (see comments)  [] Plan of care initiated [] Hold pending MD visit [] Discharge    Electronically signed by: Beltran Aguilar PT, DPT      Note: If patient does not return for scheduled/ recommended follow up visits, this note will serve as a discharge from care along with most recent update on progress.

## 2020-11-20 ENCOUNTER — HOSPITAL ENCOUNTER (OUTPATIENT)
Dept: PHYSICAL THERAPY | Age: 55
Setting detail: THERAPIES SERIES
Discharge: HOME | End: 2020-11-20

## 2020-11-20 PROCEDURE — 97530 THERAPEUTIC ACTIVITIES: CPT

## 2020-11-20 PROCEDURE — 97112 NEUROMUSCULAR REEDUCATION: CPT

## 2020-11-20 NOTE — FLOWSHEET NOTE
[address] [facility] Physical Therapy  Phone: [phone]   Fax: [phone]    Physical Therapy Treatment Note/ Progress Report:     Date:  2020    Patient Name:  Julieta Davis    :  1965  MRN: [id number]  Restrictions/Precautions:    Medical/Treatment Diagnosis Information:  Diagnosis: M00.9 (ICD-10-CM) - Septic arthritis of left ankle, due to unspecified organism [facility])  Treatment Diagnosis: L ankle hypomobility at talocrural, subtalar, and digits; decreased L ankle ROM, decreased L ankle strength, abnormal gait, decreased balance       Insurance/Certification information:  PT Insurance Information: Self Pay  Physician Information:  Referring Practitioner: Brandy Moncada MD  Plan of care signed (Y/N): []  Yes [x]  No     Date of Patient follow up with Physician: ALEJANDRA     Progress Report: []  Yes  [x]  No     Date Range for reporting period:  Beginnin2020  Ending:     Progress report due (10 Rx/or 30 days whichever is less): visit #18 or      Recertification due (POC duration/ or 90 days whichever is less): visit #12 or 10/16/2020 (6 weeks)     Visit # Insurance Allowable Auth required? Date Range   11 Self Pay []  Yes  [x]  No -     Latex Allergy:  [x]NO      []YES  Preferred Language for Healthcare:   [x]English       []other:    Functional Scale:        Date assessed:  LEFS: raw score = 57; dysfunction = 28.75%   10/30/2020    Pain level:  2/10     SUBJECTIVE: Pt reports she is feeling better compared to last week. Today she is feeling especially good. After she does the balance exercises at therapy she feels better.      *phone  used for this session    OBJECTIVE:   Palpation: TPP not present at ankle, reports global pain     Functional Mobility/Transfers: decreased L ankle mobility with squats, sit<>stand transitions; performs independently      Posture: decreased arches B (L > R), L calcaneal eversion with pronation      Bandages/Dressings/Incisions: n/a     Gait: independent w/o AD; decreased stance time on R LE, shorted step length on L LE, midfoot initial contact on L with early heel rise at terminal stance on L      Dermatomes Normal Abnormal Comments   inguinal area (L1)          anterior mid-thigh (L2) x       distal ant thigh/med knee (L3) x       medial lower leg and foot (L4) x       lateral lower leg and foot (L5) x       posterior calf (S1) x       medial calcaneus (S2) x                      PROM AROM     L R L R   Dorsiflexion      14 20   Plantarflexion      34 45   Inversion      20 35   Eversion      20 20         Strength (0-5) / Myotomes Left Right   Ankle Dorsiflexion (L4-5) 5 5   Ankle Plantarflexion (S1-2) 4 4   Ankle Inversion 4+ 5   Ankle Eversion (S1-2) 4+ 5   Great Toe Extension (L5)                  Joint mobility: L talocrual               []?Normal                      [x]? Hypo              []?Hyper     Orthopaedic Special Tests Positive  Negative  NT Comments    Ankle           Anterior Drawer   x       Talar Tilt   x       Vyas   x       Brock's    x                      Balance: SLS on R = 20s; SLS on L = 14s; fair balance overall, decrease motion with limited ankle strategy available on L         RESTRICTIONS/PRECAUTIONS: previous L ankle clean out for infection     Exercises/Interventions:     Therapeutic Exercise (27904)  Resistance / level Sets/sec Reps Notes / Cues   IB - Gastroc Stretch  30\" 2    IB - Soleus Stretch  30\" 2    Heel raises  2 10    Ankle Stretches        Ankle Circles        TB resisted DF  TB resisted PF  TB resisted Inv  TB resisted Evr Added 9/18    Added to HEP 9/25  Added to HEP 9/25   Foot Doming  Added 9/25/HEP                               Pt ed regarding appropriate performance (reps, frequency, foot wear) for HEP       Therapeutic Activities (61732)       Mini Squatting     Added 10/2   Pt ed regarding appropriate footwear       Step up to SLS  Added 10/2, resume npv   Squat    Added 11/6   Step down 2\"2 10    Lateral step down 4\" 2 10 Added 116   Neuromuscular Re-ed (29918)       BAPS A-P, M-L, CW, CCW 10/9   Tilt board A-P, M-L  1 10 each 10/9   Static balance on tilt board, A-P, M-L  30\" each 10/9   Tandem Stance     SLS  30\" 3 L    Semi tandem EC on airex  30\" 2 Added 10/2   Incline foam EC     BOSU step up frwd and lat  2/3\" 10 each Added lateral 11/6   Standing foot ankle posture  10/23 mirror for visual cueing   Semi tandem eyes closed  Added 9/25   Manual Intervention (08036)       Talocrural distraction and A-P glide     Subtalar distraction     MWM subtalar distraction with AROM DF     Great toe ext PROM, 1st ray PROM DF/Evr                              Pt. Education:  -pt educated on diagnosis, prognosis and expectations for rehab  -all pt questions were answered    Home Exercise Program:  Access Code: [email or web address]   URL: [employer]/   Date: 09/04/2020   Prepared by: Mick Butler into South Korean for patient   Exercises   Standing Heel Raise - 10 reps - 3 sets - 1x daily - 7x weekly   Standing Ankle Dorsiflexion with Table Support - 10 reps - 3 sets - 1x daily - 7x weekly   Mini Squat with Chair - 10 reps - 2 sets - 1x daily - 7x weekly   Gastroc Stretch on Wall - 3 reps - 20 hold - 1x daily - 7x weekly   Standing Gastroc Stretch on Step - 3 reps - 60s hold - 2-3x daily - 7x weekly   Standing Soleus Stretch on Step - 3 reps - 60s hold - 2-3x daily - 7x weekly   Seated Ankle Circles - 10 reps - 3 sets - 1x daily - 7x weekly   Seated Toe Flexion Extension PROM - 10 reps - 10s hold - 2x daily - 7x weekly     Access Code: [email or web address]   URL: [employer]/   Date: 09/18/2020   Prepared by: Ning Dalal into South Korean for patient  Exercises   Tandem Stance - 4 reps - 1 sets - 30 hold - 1x daily - 7x weekly 10/16>>Semi tandem eyes closed    Access Code: [email or web address]   URL: [employer]/   Date: 09/25/2020   Prepared by: Ning Dalal ([id number])Reviewed/Progressed HEP activities related to improving balance, coordination, kinesthetic sense, posture, motor skill, proprioception of core, proximal hip and LE for self care, mobility, lifting, and ambulation/stair navigation      Manual Treatments:  PROM / STM / Oscillations-Mobs:  G-I, II, III, IV (PA's, Inf., Post.)  [x] (82533) Provided manual therapy to mobilize LE, proximal hip and/or LS spine soft tissue/joints for the purpose of modulating pain, promoting relaxation,  increasing ROM, reducing/eliminating soft tissue swelling/inflammation/restriction, improving soft tissue extensibility and allowing for proper ROM for normal function with self care, mobility, lifting and ambulation. Modalities:  [] (75067) Vasopneumatic compression: Utilized vasopneumatic compression to decrease edema / swelling for the purpose of improving mobility and quad tone / recruitment which will allow for increased overall function including but not limited to self-care, transfers, ambulation, and ascending / descending stairs. Modalities:      Charges:  Timed Code Treatment Minutes: 36   Total Treatment Minutes: 36     [] EVAL - LOW (34397)   [] EVAL - MOD (24534)  [] EVAL - HIGH (71892)  [] RE-EVAL (92889)  [] EL(63271) x      [] Ionto  [x] NMR (72887) x  1   [] Vaso  [] Manual (45788) x    [] Ultrasound  [x] TA x 1    [] Mech Traction (01730)  [] Aquatic Therapy x     [] ES (un) (71125):   [] Home Management Training x [] ES(attended) (91263)   [] Group:     [] Other:     GOALS:  Patient stated goal: improve ankle motion, decrease pain   []? Progressing: []? Met: []? Not Met: []? Adjusted     Therapist goals for Patient:   Short Term Goals: To be achieved in: 2 weeks  1. Independent in HEP and progression per patient tolerance, in order to prevent re-injury. []? Progressing: [x]? Met: []? Not Met: []? Adjusted  2.  Patient will have a decrease in pain to facilitate improvement in movement, function, and ADLs compared to previous visit. Reintroduced lateral and anterior step down. Requested pt reintroduce step downs in her HEP. Please follow up with the response to changing the HEP at the next visit. Continue to address foot/ankle posture in CKC, functional strength, and balance. Consider DC in next 2 weeks/visits. Treatment/Activity Tolerance:  [x] Pt able to complete treatment [] Patient limited by fatique  [] Patient limited by pain  [] Patient limited by other medical complications  [] Other:     Prognosis: [x] Good [] Fair  [] Poor    Patient Requires Follow-up: [x] Yes  [] No    Return to Play:    [x]  N/A    PLAN: See eval. PT 1-2x / week for 4-6 weeks. Start 1x/week for 4 weeks @ 30 min sessions due to self pay   [x] Continue per plan of care [] Alter current plan (see comments)  [] Plan of care initiated [] Hold pending MD visit [] Discharge    Electronically signed by: Nik Britt PT, DPT      Note: If patient does not return for scheduled/ recommended follow up visits, this note will serve as a discharge from care along with most recent update on progress.

## 2020-12-04 ENCOUNTER — HOSPITAL ENCOUNTER (OUTPATIENT)
Dept: PHYSICAL THERAPY | Age: 55
Setting detail: THERAPIES SERIES
Discharge: HOME | End: 2020-12-04

## 2020-12-04 PROCEDURE — 97110 THERAPEUTIC EXERCISES: CPT

## 2020-12-04 PROCEDURE — 97530 THERAPEUTIC ACTIVITIES: CPT

## 2020-12-04 NOTE — FLOWSHEET NOTE
[facility]  Phone: [phone]   Fax: [phone]   Physical Therapy Re-Certification Plan of Care    Dear Dr. Enoch Lott,    We had the pleasure of treating the following patient for physical therapy services at [facility]. A summary of our findings can be found in the updated assessment below. This includes our plan of care. If you have any questions or concerns regarding these findings, please do not hesitate to contact me at the office phone number checked above. Thank you for the referral.     Physician Signature:________________________________Date:__________________  By signing above (or electronic signature), therapists plan is approved by physician      Functional Outcome: LEFS raw score = 57; dysfunction = 28.75%    Overall Response to Treatment:   [x]Patient is responding well to treatment and improvement is noted with regards  to goals   []Patient should continue to improve in reasonable time if they continue HEP   []Patient has plateaued and is no longer responding to skilled PT intervention    []Patient is getting worse and would benefit from return to referring MD   []Patient unable to adhere to initial POC   [x]Other: See assessment    Date range of Visits: 9/4/20-12/4/20  Total Visits: 12    ASSESSMENT: Pt is a 49 yo female referred to PT for L ankle pain. Pt had arthroscopic surgery Oct 2019 in which she was diagnosed with septic arthritis of left ankle. She has been seen for 12 visits in PT. She presents with improved posture, balance, gait pattern, gross ROM, and gross L ankle strength. She continues to present with deficits in muscle endurance, balance, posture, and strength. She will benefit from continued skilled therapy to address these deficits, achieve goals, and progress to PLOF. Pt would like to trial a period of self management as she is self pay.  She is consistent and independent with her current HEP and appropriate for this plan. Pt plans to follow up with PT in the next 30 days if she is struggling with self management. Recommendation:    [x]Continue PT 1x / wk for 4 weeks. []Hold PT, pending MD visit      Physical Therapy Treatment Note/ Progress Report:     Date:  2020    Patient Name:  Tessy Moon    :  1965  MRN: [id number]  Restrictions/Precautions:    Medical/Treatment Diagnosis Information:  Diagnosis: M00.9 (ICD-10-CM) - Septic arthritis of left ankle, due to unspecified organism [facility])  Treatment Diagnosis: L ankle hypomobility at talocrural, subtalar, and digits; decreased L ankle ROM, decreased L ankle strength, abnormal gait, decreased balance       Insurance/Certification information:  PT Insurance Information: Self Pay  Physician Information:  Referring Practitioner: Pattie Pires MD  Plan of care signed (Y/N): []  Yes [x]  No     Date of Patient follow up with Physician: TBD     Progress Report: []  Yes  [x]  No     Date Range for reporting period:  Beginnin2020  Ending:     Progress report due (10 Rx/or 30 days whichever is less): visit #18 or      Recertification due (POC duration/ or 90 days whichever is less): visit #12 or 10/16/2020 (6 weeks)     Visit # Insurance Allowable Auth required? Date Range   12 Self Pay []  Yes  [x]  No -     Latex Allergy:  [x]NO      []YES  Preferred Language for Healthcare:   [x]English       []other:    Functional Scale:        Date assessed:  LEFS: raw score = 57; dysfunction = 28.75%   10/30/2020    Pain level:  1-2/10     SUBJECTIVE: Pt reports she is feeling better compared to last week.      *phone  used for this session    OBJECTIVE:   Palpation: TPP not present at ankle, reports global pain     Functional Mobility/Transfers:  performs independently      Posture: decreased arches B (L > R), L calcaneal eversion with pronation      Bandages/Dressings/Incisions: n/a     Gait: independent w/o AD; decreased stance time on R LE, shorted step length on L LE, midfoot initial contact on L with early heel rise at terminal stance on L      Dermatomes Normal Abnormal Comments   inguinal area (L1)          anterior mid-thigh (L2) x       distal ant thigh/med knee (L3) x       medial lower leg and foot (L4) x       lateral lower leg and foot (L5) x       posterior calf (S1) x       medial calcaneus (S2) x                      PROM AROM     L R L R   Dorsiflexion      20 20   Plantarflexion      36 45   Inversion      24 35   Eversion      20 20         Strength (0-5) / Myotomes Left Right   Ankle Dorsiflexion (L4-5) 5 5   Ankle Plantarflexion (S1-2) 5 5   Ankle Inversion 4+ 5   Ankle Eversion (S1-2) 5 5   Great Toe Extension (L5)                  Joint mobility: L talocrual               []?Normal                      [x]? Hypo              []?Hyper     Orthopaedic Special Tests Positive  Negative  NT Comments    Ankle           Anterior Drawer   x       Talar Tilt   x       Vyas   x       Brock's    x                      Balance: SLS on R = 20s; SLS on L = 24s; good to fair balance overall        RESTRICTIONS/PRECAUTIONS: previous L ankle clean out for infection     Exercises/Interventions:     Therapeutic Exercise (44890)  Resistance / level Sets/sec Reps Notes / Cues   IB - Gastroc Stretch  30\" 2    IB - Soleus Stretch  30\" 2    Heel raises  2 10    Ankle Stretches        Ankle Circles        TB resisted DF  TB resisted PF  TB resisted Inv  TB resisted Evr Lime  Lime2  210  10Added 9/18    Added to HEP 9/25  Added to HEP 9/25   Foot Doming  Added 9/25/HEP                               Pt ed regarding appropriate performance (reps, frequency, foot wear) for HEP       Therapeutic Activities (41478)       Review of compiled HEP and POC  X 5 min     Mini Squatting     Added 10/2   Pt ed regarding appropriate footwear       Step up to SLS  Added 10/2, resume npv   Squat    Added 11/6   Step down 2\"2 10    Lateral step down Added 116   Neuromuscular Re-ed (81400)       BAPS A-P, M-L, CW, CCW 10/9   Tilt board A-P, M-L  10/9   Static balance on tilt board, A-P, M-L  10/9   Tandem Stance     SLS  30\" 3 L    Semi tandem EC on airex  30\" 2 Added 10/2   Incline foam EC     BOSU step up frwd and lat  Added lateral 11/6   Standing foot ankle posture  10/23 mirror for visual cueing   Semi tandem eyes closed  Added 9/25   Manual Intervention (53128)       Talocrural distraction and A-P glide     Subtalar distraction     MWM subtalar distraction with AROM DF     Great toe ext PROM, 1st ray PROM DF/Evr                              Pt. Education:  -pt educated on diagnosis, prognosis and expectations for rehab  -all pt questions were answered    Home Exercise Program:  Access Code: [email or web address]   URL: [employer]/   Date: 09/04/2020   Prepared by: Janusz Melgoza into Brazilian for patient   Exercises   Standing Heel Raise - 10 reps - 3 sets - 1x daily - 7x weekly   Standing Ankle Dorsiflexion with Table Support - 10 reps - 3 sets - 1x daily - 7x weekly   Mini Squat with Chair - 10 reps - 2 sets - 1x daily - 7x weekly   Gastroc Stretch on Wall - 3 reps - 20 hold - 1x daily - 7x weekly   Standing Gastroc Stretch on Step - 3 reps - 60s hold - 2-3x daily - 7x weekly   Standing Soleus Stretch on Step - 3 reps - 60s hold - 2-3x daily - 7x weekly   Seated Ankle Circles - 10 reps - 3 sets - 1x daily - 7x weekly   Seated Toe Flexion Extension PROM - 10 reps - 10s hold - 2x daily - 7x weekly     Access Code: [email or web address]   URL: EeBria/   Date: 09/18/2020   Prepared by: Tova Darnell into Brazilian for patient  Exercises   Tandem Stance - 4 reps - 1 sets - 30 hold - 1x daily - 7x weekly 10/16>>Semi tandem eyes closed    Access Code: [email or web address]   URL: [email or web address]   Date: 09/25/2020   Prepared by: Tova Darnell into Brazilian for patient  Exercises   Seated Ankle Inversion with Resistance and Legs Crossed - 10 reps - 2 sets - 1x daily - 4x weekly   Ankle Eversion with Resistance - 10 reps - 2 sets - 1x daily - 4x weekly   Seated Arch Lifts - 10 reps - 2 sets - 5 hold - 1x daily - 4x weekly     12/4 Access Code: [email or web address]   URL: [employer]/   Date: 12/04/2020   Prepared by: Florin Casper     Exercises   Gastroc Stretch on Wall - 2 reps - 1 sets - 30 hold - 1x daily - 7x weekly   Soleus Stretch on Wall - 2 reps - 1 sets - 30 hold - 1x daily - 7x weekly   Forward Step Down - 10 reps - 2 sets - 1x daily - 7x weekly   Single Leg Stance - 2 reps - 1 sets - 30 hold - 1x daily - 7x weekly   Long Sitting Ankle Eversion with Resistance - 10 reps - 2 sets - 1x daily - 7x weekly   Long Sitting Ankle Inversion with Resistance - 10 reps - 2 sets - 1x daily - 7x weekly   Standing Heel Raises - 10 reps - 2 sets - 1x daily - 7x weekly   Squat with Counter Support - 10 reps - 2 sets - 1x daily - 7x weekly           Therapeutic Exercise and NMR EXR  [x] (82213) Provided verbal/tactile cueing for activities related to strengthening, flexibility, endurance, ROM for improvements in LE, proximal hip, and core control with self care, mobility, lifting, ambulation.  [] (84743) Provided verbal/tactile cueing for activities related to improving balance, coordination, kinesthetic sense, posture, motor skill, proprioception  to assist with LE, proximal hip, and core control in self care, mobility, lifting, ambulation and eccentric single leg control.   [] (73532) Therapist is in constant attendance of 2 or more patients providing skilled therapy interventions, but not providing any significant amount of measurable one-on-one time to either patient, for improvements in LE, proximal hip, and core control in self care, mobility, lifting, ambulation and eccentric single leg control.      NMR and Therapeutic Activities:    [x] (20073 or 73931) Provided verbal/tactile cueing for activities related to improving balance, coordination, kinesthetic sense, posture, motor skill, proprioception and motor activation to allow for proper function of core, proximal hip and LE with self care and ADLs  [] (17819) Gait Re-education- Provided training and instruction to the patient for proper LE, core and proximal hip recruitment and positioning and eccentric body weight control with ambulation re-education including up and down stairs     Home Exercise Program:    [x] (31487) Reviewed/Progressed HEP activities related to strengthening, flexibility, endurance, ROM of core, proximal hip and LE for functional self-care, mobility, lifting and ambulation/stair navigation   [] (96566)Reviewed/Progressed HEP activities related to improving balance, coordination, kinesthetic sense, posture, motor skill, proprioception of core, proximal hip and LE for self care, mobility, lifting, and ambulation/stair navigation      Manual Treatments:  PROM / STM / Oscillations-Mobs:  G-I, II, III, IV (PA's, Inf., Post.)  [x] (10166) Provided manual therapy to mobilize LE, proximal hip and/or LS spine soft tissue/joints for the purpose of modulating pain, promoting relaxation,  increasing ROM, reducing/eliminating soft tissue swelling/inflammation/restriction, improving soft tissue extensibility and allowing for proper ROM for normal function with self care, mobility, lifting and ambulation. Modalities:  [] (14156) Vasopneumatic compression: Utilized vasopneumatic compression to decrease edema / swelling for the purpose of improving mobility and quad tone / recruitment which will allow for increased overall function including but not limited to self-care, transfers, ambulation, and ascending / descending stairs.        Modalities:      Charges:  Timed Code Treatment Minutes: 35   Total Treatment Minutes: 35     [] EVAL - LOW (34761)   [] EVAL - MOD (88153)  [] EVAL - HIGH (83696)  [] RE-EVAL (87991)  [x] LR(88796) x   1   [] Ionto  [] NMR (56750) x    [] Vaso  [] Manual (94898) x    [] Ultrasound  [x] TA x 1    [] Mech Traction (65762)  [] Aquatic Therapy x     [] ES (un) (10257):   [] Home Management Training x [] ES(attended) (67968)   [] Group:     [] Other:     GOALS:  Patient stated goal: improve ankle motion, decrease pain   []? Progressing: []? Met: []? Not Met: []? Adjusted     Therapist goals for Patient:   Short Term Goals: To be achieved in: 2 weeks  1. Independent in HEP and progression per patient tolerance, in order to prevent re-injury. []? Progressing: [x]? Met: []? Not Met: []? Adjusted  2. Patient will have a decrease in pain to facilitate improvement in movement, function, and ADLs as indicated by Functional Deficits. []? Progressing: [x]? Met: []? Not Met: []? Adjusted     Long Term Goals: To be achieved in: 4-6 weeks  1. Disability index score of 25% or less for the LEFS to assist with reaching prior level of function. [x]? Progressing: []? Met: []? Not Met: []? Adjusted  2. Patient will demonstrate increased AROM DF to 15-20deg to allow for proper joint functioning as indicated by patients Functional Deficits. []? Progressing: [x]? Met: []? Not Met: []? Adjusted  3. Patient will demonstrate an increase in Strength to at least 4+/5 or greater as well as good proximal hip strength and control to allow for proper functional mobility as indicated by patients Functional Deficits. []? Progressing: [x]? Met: []? Not Met: []? Adjusted  4. Patient will return to functional activities including walking without increased symptoms or restriction. [x]? Progressing: []? Met: []? Not Met: []? Adjusted  5. Patient will return to functional activities including ability to navigate stairs with reciprocal pattern and 1 UE A without increased symptoms or restriction. []? Progressing: [x]? Met: []? Not Met: []?  Adjusted      Overall Progression Towards Functional goals/ Treatment Progress Update:  [] Patient is progressing as expected towards signed by: Per Sprague PT, DPT      Note: If patient does not return for scheduled/ recommended follow up visits, this note will serve as a discharge from care along with most recent update on progress.

## 2022-12-08 ENCOUNTER — OFFICE VISIT (OUTPATIENT)
Dept: INTERNAL MEDICINE CLINIC | Age: 57
End: 2022-12-08

## 2022-12-08 VITALS
SYSTOLIC BLOOD PRESSURE: 122 MMHG | DIASTOLIC BLOOD PRESSURE: 80 MMHG | TEMPERATURE: 98.1 F | OXYGEN SATURATION: 99 % | WEIGHT: 211 LBS | HEART RATE: 92 BPM | BODY MASS INDEX: 45.66 KG/M2

## 2022-12-08 DIAGNOSIS — Z12.4 CERVICAL CANCER SCREENING: ICD-10-CM

## 2022-12-08 DIAGNOSIS — Z13.1 SCREENING FOR DIABETES MELLITUS: ICD-10-CM

## 2022-12-08 DIAGNOSIS — E66.01 CLASS 3 SEVERE OBESITY DUE TO EXCESS CALORIES WITH BODY MASS INDEX (BMI) OF 45.0 TO 49.9 IN ADULT, UNSPECIFIED WHETHER SERIOUS COMORBIDITY PRESENT (HCC): ICD-10-CM

## 2022-12-08 DIAGNOSIS — I83.813 VARICOSE VEINS OF BOTH LOWER EXTREMITIES WITH PAIN: ICD-10-CM

## 2022-12-08 DIAGNOSIS — G89.29 CHRONIC PAIN OF LEFT ANKLE: Chronic | ICD-10-CM

## 2022-12-08 DIAGNOSIS — Z76.89 ENCOUNTER TO ESTABLISH CARE: ICD-10-CM

## 2022-12-08 DIAGNOSIS — M25.572 CHRONIC PAIN OF LEFT ANKLE: Chronic | ICD-10-CM

## 2022-12-08 DIAGNOSIS — Z76.89 ENCOUNTER TO ESTABLISH CARE: Primary | ICD-10-CM

## 2022-12-08 PROBLEM — M65.972 SYNOVITIS OF LEFT ANKLE: Status: RESOLVED | Noted: 2019-08-06 | Resolved: 2022-12-08

## 2022-12-08 PROBLEM — L03.116 CELLULITIS OF LEFT ANKLE: Status: RESOLVED | Noted: 2019-10-23 | Resolved: 2022-12-08

## 2022-12-08 PROBLEM — M65.9 SYNOVITIS OF LEFT ANKLE: Status: RESOLVED | Noted: 2019-08-06 | Resolved: 2022-12-08

## 2022-12-08 PROBLEM — E66.813 CLASS 3 SEVERE OBESITY DUE TO EXCESS CALORIES WITH BODY MASS INDEX (BMI) OF 45.0 TO 49.9 IN ADULT: Status: ACTIVE | Noted: 2022-12-08

## 2022-12-08 PROBLEM — I83.93 VARICOSE VEINS OF BOTH LOWER EXTREMITIES: Status: ACTIVE | Noted: 2022-12-08

## 2022-12-08 LAB
A/G RATIO: 1.5 (ref 1.1–2.2)
ALBUMIN SERPL-MCNC: 4.5 G/DL (ref 3.4–5)
ALP BLD-CCNC: 89 U/L (ref 40–129)
ALT SERPL-CCNC: 17 U/L (ref 10–40)
ANION GAP SERPL CALCULATED.3IONS-SCNC: 14 MMOL/L (ref 3–16)
AST SERPL-CCNC: 20 U/L (ref 15–37)
BASOPHILS ABSOLUTE: 0.1 K/UL (ref 0–0.2)
BASOPHILS RELATIVE PERCENT: 0.8 %
BILIRUB SERPL-MCNC: 0.4 MG/DL (ref 0–1)
BUN BLDV-MCNC: 17 MG/DL (ref 7–20)
CALCIUM SERPL-MCNC: 9.4 MG/DL (ref 8.3–10.6)
CHLORIDE BLD-SCNC: 106 MMOL/L (ref 99–110)
CHOLESTEROL, TOTAL: 181 MG/DL (ref 0–199)
CO2: 24 MMOL/L (ref 21–32)
CREAT SERPL-MCNC: <0.5 MG/DL (ref 0.6–1.1)
EOSINOPHILS ABSOLUTE: 0.1 K/UL (ref 0–0.6)
EOSINOPHILS RELATIVE PERCENT: 1.7 %
GFR SERPL CREATININE-BSD FRML MDRD: >60 ML/MIN/{1.73_M2}
GLUCOSE BLD-MCNC: 110 MG/DL (ref 70–99)
HCT VFR BLD CALC: 40.2 % (ref 36–48)
HDLC SERPL-MCNC: 53 MG/DL (ref 40–60)
HEMOGLOBIN: 13 G/DL (ref 12–16)
LDL CHOLESTEROL CALCULATED: 111 MG/DL
LYMPHOCYTES ABSOLUTE: 1.3 K/UL (ref 1–5.1)
LYMPHOCYTES RELATIVE PERCENT: 19.1 %
MCH RBC QN AUTO: 28.6 PG (ref 26–34)
MCHC RBC AUTO-ENTMCNC: 32.5 G/DL (ref 31–36)
MCV RBC AUTO: 88 FL (ref 80–100)
MONOCYTES ABSOLUTE: 0.4 K/UL (ref 0–1.3)
MONOCYTES RELATIVE PERCENT: 6.3 %
NEUTROPHILS ABSOLUTE: 5 K/UL (ref 1.7–7.7)
NEUTROPHILS RELATIVE PERCENT: 72.1 %
PDW BLD-RTO: 14.9 % (ref 12.4–15.4)
PLATELET # BLD: 252 K/UL (ref 135–450)
PMV BLD AUTO: 10.2 FL (ref 5–10.5)
POTASSIUM SERPL-SCNC: 4.9 MMOL/L (ref 3.5–5.1)
RBC # BLD: 4.56 M/UL (ref 4–5.2)
SODIUM BLD-SCNC: 144 MMOL/L (ref 136–145)
TOTAL PROTEIN: 7.5 G/DL (ref 6.4–8.2)
TRIGL SERPL-MCNC: 85 MG/DL (ref 0–150)
VLDLC SERPL CALC-MCNC: 17 MG/DL
WBC # BLD: 7 K/UL (ref 4–11)

## 2022-12-08 PROCEDURE — 99214 OFFICE O/P EST MOD 30 MIN: CPT | Performed by: STUDENT IN AN ORGANIZED HEALTH CARE EDUCATION/TRAINING PROGRAM

## 2022-12-08 SDOH — ECONOMIC STABILITY: FOOD INSECURITY: WITHIN THE PAST 12 MONTHS, THE FOOD YOU BOUGHT JUST DIDN'T LAST AND YOU DIDN'T HAVE MONEY TO GET MORE.: SOMETIMES TRUE

## 2022-12-08 SDOH — ECONOMIC STABILITY: FOOD INSECURITY: WITHIN THE PAST 12 MONTHS, YOU WORRIED THAT YOUR FOOD WOULD RUN OUT BEFORE YOU GOT MONEY TO BUY MORE.: OFTEN TRUE

## 2022-12-08 ASSESSMENT — ENCOUNTER SYMPTOMS
ABDOMINAL PAIN: 0
VOMITING: 0
WHEEZING: 0
NAUSEA: 0
STRIDOR: 0
CHEST TIGHTNESS: 0
PHOTOPHOBIA: 0
TROUBLE SWALLOWING: 0
CONSTIPATION: 0
CHOKING: 0
VOICE CHANGE: 0
COUGH: 0
ABDOMINAL DISTENTION: 0
SHORTNESS OF BREATH: 0
APNEA: 0
DIARRHEA: 0

## 2022-12-08 ASSESSMENT — PATIENT HEALTH QUESTIONNAIRE - PHQ9
2. FEELING DOWN, DEPRESSED OR HOPELESS: 1
SUM OF ALL RESPONSES TO PHQ QUESTIONS 1-9: 2
1. LITTLE INTEREST OR PLEASURE IN DOING THINGS: 1
SUM OF ALL RESPONSES TO PHQ QUESTIONS 1-9: 2
SUM OF ALL RESPONSES TO PHQ9 QUESTIONS 1 & 2: 2

## 2022-12-08 ASSESSMENT — SOCIAL DETERMINANTS OF HEALTH (SDOH): HOW HARD IS IT FOR YOU TO PAY FOR THE VERY BASICS LIKE FOOD, HOUSING, MEDICAL CARE, AND HEATING?: HARD

## 2022-12-08 NOTE — PATIENT INSTRUCTIONS
The [address] [facility]  Address: [address] [place] [address]  Phone: [phone]

## 2022-12-08 NOTE — PROGRESS NOTES
Charo Hall (:  1965) is a 56 y.o. female,Established patient, here for evaluation of the following chief complaint(s):  New Patient         ASSESSMENT/PLAN:  1. Encounter to establish care  Assessment & Plan:   Overall patient is doing well  Will obtain routine labs  Discussed about her maintenance. She is going to schedule her mammogram soon. Will send referral to Dr. Aston Burch for routine checkup and Pap smear. Orders:  -     CBC with Auto Differential; Future  -     Comprehensive Metabolic Panel; Future  -     LIPID PANEL; Future  -     Hemoglobin A1C; Future  2. Class 3 severe obesity due to excess calories with body mass index (BMI) of 45.0 to 49.9 in adult, unspecified whether serious comorbidity present [facility])  Assessment & Plan:  Counseled patient on weight loss  Orders:  -     CBC with Auto Differential; Future  -     Comprehensive Metabolic Panel; Future  -     LIPID PANEL; Future  -     Hemoglobin A1C; Future  3. Screening for diabetes mellitus  -     Hemoglobin A1C; Future  4. Cervical cancer screening  -     Justen Harris MD, Gynecology, [facility]  5. Chronic pain of left ankle  Assessment & Plan:  Has prior history of septic arthritis of left ankle status post arthrotomy with I&D in . Completed PT following surgery with great improvement  -Stretch exercise of the ankle provided to the  -If no improvement in 3 months, can benefit with PT    6. Varicose veins of both lower extremities with pain  Assessment & Plan:  Compression stocking  Exercise   Also discussed with pt possible intervention (referral for laser therapy) if no improvement with conservative measures      Return in about 6 months (around 2023) for if not able to establish with [address] clinic. Subjective   SUBJECTIVE/OBJECTIVE:  HPI  51-year-old female Malian speaker  who presented today with her  to establish care.   She is a former patient of Dr. Mariely Lugo, who recently moved away.   service was used to translate throughout the exam today. Denies any acute complaints today. Been doing well since her last visit with Dr. Alexandra Mckinney. About 2 years ago, she had septic arthritis of her left ankle that required surgical intervention and IV antibiotics. intermittently her ankle with the painful when she is immobilized for a long while especially after long periods of sitting or standing. Improved with movement. Denies any gait issues or any limited in her range of motion. No joint swelling. Also endorses multiple varicose veins on both of her legs for many years. Typically would have some tenderness. She has not tried anything prior for treatment. Currently is not working, but she does a lot of cleaning around the house. She reports that prior to see Dr. Alexandra Mckinney, she was seen at a medical clinic where she was able to get a lot of free medical care as she is self-pay. She could not recall the name of the clinic. I discussed with her about [facility] outpatient clinic. Contact information provided to patient    Review of Systems   Constitutional:  Negative for activity change, appetite change, chills, diaphoresis, fatigue, fever and unexpected weight change. HENT:  Negative for trouble swallowing and voice change. Eyes:  Negative for photophobia and visual disturbance. Respiratory:  Negative for apnea, cough, choking, chest tightness, shortness of breath, wheezing and stridor. Cardiovascular:  Negative for chest pain, palpitations and leg swelling. Gastrointestinal:  Negative for abdominal distention, abdominal pain, constipation, diarrhea, nausea and vomiting. Genitourinary:  Negative for difficulty urinating and dysuria. Skin:  Negative for rash and wound. Neurological:  Negative for dizziness, weakness and light-headedness. Psychiatric/Behavioral:  Negative for agitation and behavioral problems.          No current outpatient medications on file.     No current facility-administered medications for this visit. Objective     Vitals:    12/08/22 0845   BP: 122/80   Pulse: 92   Temp: 98.1 °F (36.7 °C)   SpO2: 99%        Lab Results   Component Value Date    WBC 3.9 12/09/2019    HGB 12.3 12/09/2019    HCT 36.6 12/09/2019    MCV 86.2 12/09/2019     12/09/2019      Lab Results   Component Value Date     12/09/2019    K 4.0 12/09/2019     12/09/2019    CO2 26 12/09/2019    BUN 20 12/09/2019    CREATININE 0.58 12/09/2019    GLUCOSE 83 12/09/2019    CALCIUM 8.9 12/09/2019    PROT 7.1 12/09/2019    LABALBU 3.6 12/09/2019    BILITOT 0.2 12/09/2019    ALKPHOS 57 12/09/2019    AST 17 12/09/2019    ALT 8 (L) 12/09/2019    LABGLOM 104 12/09/2019    GFRAA 120 12/09/2019    AGRATIO 0.9 (L) 10/24/2019    GLOB 4.0 10/24/2019     No results found for: CHOL  No results found for: TRIG  No results found for: HDL  No results found for: LDLCHOLESTEROL, LDLCALC  No results found for: LABVLDL, VLDL  No results found for: [facility]  Lab Results   Component Value Date    LABA1C 5.4 10/24/2019     Lab Results   Component Value Date    .3 10/24/2019           Physical Exam  Vitals and nursing note reviewed. Constitutional:       General: She is not in acute distress. Appearance: She is well-developed. She is obese. She is not ill-appearing, toxic-appearing or diaphoretic. HENT:      Head: Normocephalic and atraumatic. Eyes:      General: No scleral icterus. Conjunctiva/sclera: Conjunctivae normal.      Pupils: Pupils are equal, round, and reactive to light. Cardiovascular:      Rate and Rhythm: Normal rate and regular rhythm. Heart sounds: Normal heart sounds. No murmur heard. No friction rub. No gallop. Pulmonary:      Effort: Pulmonary effort is normal. No respiratory distress. Breath sounds: Normal breath sounds. No wheezing or rales. Chest:      Chest wall: No tenderness.    Abdominal:      General: Bowel sounds are normal. There is no distension. Palpations: Abdomen is soft. Musculoskeletal:         General: No swelling, tenderness or deformity. Normal range of motion. Cervical back: Normal range of motion and neck supple. Comments: Left ankle: Well-healed surgical scar. Full range of motion. Multiple varicose veins bilaterally lower extremities. Skin:     General: Skin is warm and dry. Neurological:      Mental Status: She is alert and oriented to person, place, and time. Cranial Nerves: No cranial nerve deficit. Sensory: No sensory deficit. Psychiatric:         Behavior: Behavior normal.            Please note that this chart was generated using dragon dictation software. Although every effort was made to ensure the accuracy of this automated transcription, some errors in transcription may have occurred.        --Laura Rodriguez MD

## 2022-12-08 NOTE — ASSESSMENT & PLAN NOTE
Overall patient is doing well  Will obtain routine labs  Discussed about her maintenance. She is going to schedule her mammogram soon. Will send referral to Dr. Madeline Hinojosa for routine checkup and Pap smear.

## 2022-12-08 NOTE — ASSESSMENT & PLAN NOTE
Has prior history of septic arthritis of left ankle status post arthrotomy with I&D in 2020.   Completed PT following surgery with great improvement  -Stretch exercise of the ankle provided to the  -If no improvement in 3 months, can benefit with PT

## 2022-12-08 NOTE — ASSESSMENT & PLAN NOTE
Compression stocking  Exercise   Also discussed with pt possible intervention (referral for laser therapy) if no improvement with conservative measures

## 2022-12-09 LAB
ESTIMATED AVERAGE GLUCOSE: 125.5 MG/DL
HBA1C MFR BLD: 6 %

## 2023-06-06 ENCOUNTER — OFFICE VISIT (OUTPATIENT)
Dept: INTERNAL MEDICINE CLINIC | Age: 58
End: 2023-06-06

## 2023-06-06 VITALS
HEART RATE: 85 BPM | WEIGHT: 219 LBS | HEIGHT: 57 IN | SYSTOLIC BLOOD PRESSURE: 140 MMHG | BODY MASS INDEX: 47.25 KG/M2 | OXYGEN SATURATION: 97 % | DIASTOLIC BLOOD PRESSURE: 96 MMHG

## 2023-06-06 DIAGNOSIS — G89.29 CHRONIC PAIN OF BOTH ANKLES: Primary | ICD-10-CM

## 2023-06-06 DIAGNOSIS — M25.572 CHRONIC PAIN OF BOTH ANKLES: Primary | ICD-10-CM

## 2023-06-06 DIAGNOSIS — I10 PRIMARY HYPERTENSION: ICD-10-CM

## 2023-06-06 DIAGNOSIS — I83.813 VARICOSE VEINS OF BOTH LOWER EXTREMITIES WITH PAIN: ICD-10-CM

## 2023-06-06 DIAGNOSIS — M25.571 CHRONIC PAIN OF BOTH ANKLES: Primary | ICD-10-CM

## 2023-06-06 DIAGNOSIS — R73.03 PREDIABETES: ICD-10-CM

## 2023-06-06 PROCEDURE — 3080F DIAST BP >= 90 MM HG: CPT | Performed by: STUDENT IN AN ORGANIZED HEALTH CARE EDUCATION/TRAINING PROGRAM

## 2023-06-06 PROCEDURE — 99213 OFFICE O/P EST LOW 20 MIN: CPT | Performed by: STUDENT IN AN ORGANIZED HEALTH CARE EDUCATION/TRAINING PROGRAM

## 2023-06-06 PROCEDURE — 3077F SYST BP >= 140 MM HG: CPT | Performed by: STUDENT IN AN ORGANIZED HEALTH CARE EDUCATION/TRAINING PROGRAM

## 2023-06-06 RX ORDER — MELOXICAM 7.5 MG/1
7.5 TABLET ORAL DAILY PRN
Qty: 14 TABLET | Refills: 0 | Status: SHIPPED | OUTPATIENT
Start: 2023-06-06 | End: 2023-06-20

## 2023-06-06 RX ORDER — VALSARTAN 80 MG/1
80 TABLET ORAL DAILY
Qty: 90 TABLET | Refills: 1 | Status: SHIPPED | OUTPATIENT
Start: 2023-06-06

## 2023-06-06 SDOH — ECONOMIC STABILITY: FOOD INSECURITY: WITHIN THE PAST 12 MONTHS, YOU WORRIED THAT YOUR FOOD WOULD RUN OUT BEFORE YOU GOT MONEY TO BUY MORE.: OFTEN TRUE

## 2023-06-06 SDOH — ECONOMIC STABILITY: INCOME INSECURITY: HOW HARD IS IT FOR YOU TO PAY FOR THE VERY BASICS LIKE FOOD, HOUSING, MEDICAL CARE, AND HEATING?: VERY HARD

## 2023-06-06 SDOH — ECONOMIC STABILITY: TRANSPORTATION INSECURITY
IN THE PAST 12 MONTHS, HAS LACK OF TRANSPORTATION KEPT YOU FROM MEETINGS, WORK, OR FROM GETTING THINGS NEEDED FOR DAILY LIVING?: NO

## 2023-06-06 SDOH — ECONOMIC STABILITY: FOOD INSECURITY: WITHIN THE PAST 12 MONTHS, THE FOOD YOU BOUGHT JUST DIDN'T LAST AND YOU DIDN'T HAVE MONEY TO GET MORE.: SOMETIMES TRUE

## 2023-06-06 SDOH — ECONOMIC STABILITY: HOUSING INSECURITY
IN THE LAST 12 MONTHS, WAS THERE A TIME WHEN YOU DID NOT HAVE A STEADY PLACE TO SLEEP OR SLEPT IN A SHELTER (INCLUDING NOW)?: NO

## 2023-06-06 ASSESSMENT — ENCOUNTER SYMPTOMS
TROUBLE SWALLOWING: 0
COUGH: 0
SHORTNESS OF BREATH: 0
NAUSEA: 0
CHEST TIGHTNESS: 0
STRIDOR: 0
DIARRHEA: 0
CONSTIPATION: 0
ABDOMINAL PAIN: 0
CHOKING: 0
VOMITING: 0
VOICE CHANGE: 0
ABDOMINAL DISTENTION: 0
PHOTOPHOBIA: 0
WHEEZING: 0
APNEA: 0

## 2023-06-06 NOTE — PROGRESS NOTES
Caroline Diamond (:  1965) is a 66 y.o. female,Established patient, here for evaluation of the following chief complaint(s): Annual Exam (BILAT ANKLES hurting x several days ago)         ASSESSMENT/PLAN:  1. Chronic pain of both ankles  Assessment & Plan:   Most likely related to arthritis, also have varicose veins prominent in ankles bilaterally  -Physical therapy  -Meloxicam as needed  Orders:  -     [facility] [place]  2. Varicose veins of both lower extremities with pain  Assessment & Plan:   Exercise, also has referral to PT for ankle pain   - Care instructions at home provided   - Compression stocking  3. Primary hypertension  Assessment & Plan:  Start losartan  Monitor blood pressure at home  Recheck in 4 weeks  4. Prediabetes  Assessment & Plan:  Last A1c 6.0%  Discussed low carbohydrate diet, and lifestyle modification      Return in 4 weeks (on 2023) for Hypertension (if not yet able to schedule appointment with [facility] outpatient clinic. Subjective   SUBJECTIVE/OBJECTIVE:  PEDRO PABLO Duran presented today with her  for bilateral ankle pain. Exam today was conducted via  service (Somali)  She complains of ache surrounding the ankle, left worse than right, progressively worsening over the past several weeks. Intermittently associate with some mild swelling. Pain worsened with movement, ambulation. Improved with rest.  Did not try anything at home yet for pain. Denies any trauma or injury to the ankles. Does have a history of prior septic arthritis on the left ankle status post arthrotomy and I&D in . No redness, swelling warmth on today exam.      Review of Systems   Constitutional:  Negative for activity change, appetite change, chills, diaphoresis, fatigue, fever and unexpected weight change. HENT:  Negative for trouble swallowing and voice change. Eyes:  Negative for photophobia and visual disturbance.    Respiratory:  Negative for

## 2023-06-06 NOTE — ASSESSMENT & PLAN NOTE
Exercise, also has referral to PT for ankle pain   - Care instructions at home provided   - Compression stocking

## 2023-06-06 NOTE — ASSESSMENT & PLAN NOTE
Most likely related to arthritis, also have varicose veins prominent in ankles bilaterally  -Physical therapy  -Meloxicam as needed

## 2023-06-06 NOTE — PATIENT INSTRUCTIONS
The [address] [place] [address], [address] [place] [address], [address] [place] [address]    Phone: [phone]

## 2023-07-05 ASSESSMENT — PATIENT HEALTH QUESTIONNAIRE - PHQ9
SUM OF ALL RESPONSES TO PHQ QUESTIONS 1-9: 0
1. LITTLE INTEREST OR PLEASURE IN DOING THINGS: 0
2. FEELING DOWN, DEPRESSED OR HOPELESS: 0
SUM OF ALL RESPONSES TO PHQ QUESTIONS 1-9: 0
SUM OF ALL RESPONSES TO PHQ9 QUESTIONS 1 & 2: 0
2. FEELING DOWN, DEPRESSED OR HOPELESS: NOT AT ALL
1. LITTLE INTEREST OR PLEASURE IN DOING THINGS: NOT AT ALL
SUM OF ALL RESPONSES TO PHQ9 QUESTIONS 1 & 2: 0

## 2023-07-06 ENCOUNTER — OFFICE VISIT (OUTPATIENT)
Dept: INTERNAL MEDICINE CLINIC | Age: 58
End: 2023-07-06

## 2023-07-06 VITALS
RESPIRATION RATE: 18 BRPM | DIASTOLIC BLOOD PRESSURE: 86 MMHG | SYSTOLIC BLOOD PRESSURE: 147 MMHG | HEART RATE: 77 BPM | OXYGEN SATURATION: 98 % | HEIGHT: 57 IN | TEMPERATURE: 98 F | WEIGHT: 222.2 LBS | BODY MASS INDEX: 47.94 KG/M2

## 2023-07-06 DIAGNOSIS — Z12.31 ENCOUNTER FOR SCREENING MAMMOGRAM FOR MALIGNANT NEOPLASM OF BREAST: ICD-10-CM

## 2023-07-06 DIAGNOSIS — Z12.11 SCREENING FOR COLON CANCER: ICD-10-CM

## 2023-07-06 DIAGNOSIS — M25.571 BILATERAL ANKLE PAIN, UNSPECIFIED CHRONICITY: ICD-10-CM

## 2023-07-06 DIAGNOSIS — M25.572 BILATERAL ANKLE PAIN, UNSPECIFIED CHRONICITY: ICD-10-CM

## 2023-07-06 DIAGNOSIS — I10 PRIMARY HYPERTENSION: ICD-10-CM

## 2023-07-06 DIAGNOSIS — Z13.9 SCREENING DUE: Primary | ICD-10-CM

## 2023-07-06 PROCEDURE — 99213 OFFICE O/P EST LOW 20 MIN: CPT

## 2023-07-06 RX ORDER — MELOXICAM 7.5 MG/1
7.5 TABLET ORAL DAILY PRN
Qty: 14 TABLET | Refills: 0 | Status: SHIPPED | OUTPATIENT
Start: 2023-07-06 | End: 2023-07-20

## 2023-07-06 ASSESSMENT — ENCOUNTER SYMPTOMS
ABDOMINAL PAIN: 0
VOMITING: 0
WHEEZING: 0
CHEST TIGHTNESS: 0
ABDOMINAL DISTENTION: 0
DIARRHEA: 0
CONSTIPATION: 0
SHORTNESS OF BREATH: 0

## 2023-07-06 NOTE — PATIENT INSTRUCTIONS
- purchase an Omron blood pressure machine and record your blood pressure regularly and bring them with you on your next visit  - start Physical therapy  - Please follow a healthy diet with low calories, low Na, cut down on sugary, fatty food.  Add more fruits and vegetables   - Please get your mammogram and colonoscopy done

## 2023-09-05 RX ORDER — MELOXICAM 7.5 MG/1
7.5 TABLET ORAL DAILY PRN
Qty: 14 TABLET | Refills: 0 | Status: SHIPPED | OUTPATIENT
Start: 2023-09-05 | End: 2023-09-19

## 2023-09-05 NOTE — TELEPHONE ENCOUNTER
Requested Prescriptions     Pending Prescriptions Disp Refills    meloxicam (MOBIC) 7.5 MG tablet 14 tablet 0     Sig: Take 1 tablet by mouth daily as needed for Pain       Last Clinic Visit:  7/6/2023     Next Clinic Appointment:  9/14/2023

## 2023-09-06 RX ORDER — VALSARTAN 80 MG/1
80 TABLET ORAL DAILY
Qty: 90 TABLET | Refills: 1 | Status: SHIPPED | OUTPATIENT
Start: 2023-09-06

## 2023-09-14 ENCOUNTER — TELEPHONE (OUTPATIENT)
Dept: INTERNAL MEDICINE CLINIC | Age: 58
End: 2023-09-14

## 2023-09-14 ENCOUNTER — OFFICE VISIT (OUTPATIENT)
Dept: INTERNAL MEDICINE CLINIC | Age: 58
End: 2023-09-14

## 2023-09-14 VITALS
HEART RATE: 85 BPM | SYSTOLIC BLOOD PRESSURE: 148 MMHG | BODY MASS INDEX: 48.02 KG/M2 | RESPIRATION RATE: 16 BRPM | TEMPERATURE: 98.6 F | OXYGEN SATURATION: 95 % | DIASTOLIC BLOOD PRESSURE: 76 MMHG | WEIGHT: 221.9 LBS

## 2023-09-14 DIAGNOSIS — M25.572 CHRONIC PAIN OF LEFT ANKLE: ICD-10-CM

## 2023-09-14 DIAGNOSIS — G89.29 CHRONIC PAIN OF LEFT ANKLE: ICD-10-CM

## 2023-09-14 DIAGNOSIS — Z00.00 HEALTHCARE MAINTENANCE: Primary | ICD-10-CM

## 2023-09-14 LAB — HBA1C MFR BLD: 6.2 %

## 2023-09-14 PROCEDURE — 83036 HEMOGLOBIN GLYCOSYLATED A1C: CPT

## 2023-09-14 PROCEDURE — 99213 OFFICE O/P EST LOW 20 MIN: CPT

## 2023-09-14 RX ORDER — VALSARTAN 80 MG/1
160 TABLET ORAL DAILY
Qty: 90 TABLET | Refills: 1 | Status: SHIPPED | OUTPATIENT
Start: 2023-09-14 | End: 2023-09-14 | Stop reason: DRUGHIGH

## 2023-09-14 RX ORDER — VALSARTAN 160 MG/1
160 TABLET ORAL DAILY
Qty: 30 TABLET | Refills: 3 | Status: SHIPPED | OUTPATIENT
Start: 2023-09-14

## 2023-09-14 ASSESSMENT — ENCOUNTER SYMPTOMS
CONSTIPATION: 0
BLOOD IN STOOL: 0
COUGH: 0
SHORTNESS OF BREATH: 0
BACK PAIN: 1
DIARRHEA: 0

## 2023-09-14 NOTE — PATIENT INSTRUCTIONS
-Please return to clinic in 5 weeks   - please take your medications regularly   - please exercise 5 days a week and do calf strengthening exercises   - please follow a low carb diet, cut down on the sugary food and drinks.  Eat more fruits and vegetable     Alba Fraga [id number]

## 2023-09-14 NOTE — TELEPHONE ENCOUNTER
MARGUERITE FROM [facility] NEED CLARIFICATION ON VALSARTAN RX. HAS MEDICATION BEEN INCREASED? IF SO QUANTITY IS NOT CORRECT.  [id number] [phone]

## 2023-09-15 NOTE — TELEPHONE ENCOUNTER
Valsartan increased to 160 mg . New prescription sent to [facility] for the 160 mg tablets one daily with 3 refills .  Vincent Morgan the pharmacist notified

## 2023-10-19 ENCOUNTER — OFFICE VISIT (OUTPATIENT)
Dept: INTERNAL MEDICINE CLINIC | Age: 58
End: 2023-10-19

## 2023-10-19 VITALS
HEART RATE: 78 BPM | WEIGHT: 218.3 LBS | OXYGEN SATURATION: 98 % | TEMPERATURE: 97.7 F | BODY MASS INDEX: 47.1 KG/M2 | DIASTOLIC BLOOD PRESSURE: 81 MMHG | SYSTOLIC BLOOD PRESSURE: 156 MMHG | HEIGHT: 57 IN | RESPIRATION RATE: 18 BRPM

## 2023-10-19 DIAGNOSIS — Z23 FLU VACCINE NEED: Primary | ICD-10-CM

## 2023-10-19 PROCEDURE — 6360000002 HC RX W HCPCS

## 2023-10-19 PROCEDURE — 99213 OFFICE O/P EST LOW 20 MIN: CPT

## 2023-10-19 PROCEDURE — 90674 CCIIV4 VAC NO PRSV 0.5 ML IM: CPT

## 2023-10-19 PROCEDURE — G0008 ADMIN INFLUENZA VIRUS VAC: HCPCS

## 2023-10-19 RX ADMIN — INFLUENZA A VIRUS A/GEORGIA/12/2022 CVR-167 (H1N1) ANTIGEN (MDCK CELL DERIVED, PROPIOLACTONE INACTIVATED), INFLUENZA A VIRUS A/DARWIN/11/2021 (H3N2) ANTIGEN (MDCK CELL DERIVED, PROPIOLACTONE INACTIVATED),INFLUENZA B VIRUS B/SINGAPORE/WUH4618/2021 ANTIGEN (MDCK CELL DERIVED, PROPIOLACTONE INACTIVATED),INFLUENZA B VIRUS B/SINGAPORE/INFTT-16-0610/2016 ANTIGEN (MDCK CELL DERIVED, PROPIOLACTONE INACTIVATED) 0.5 ML: 15; 15; 15; 15 INJECTION, SUSPENSION INTRAMUSCULAR at 10:08

## 2023-10-19 ASSESSMENT — ENCOUNTER SYMPTOMS
NAUSEA: 0
CONSTIPATION: 0
VOMITING: 0
COUGH: 1
RHINORRHEA: 0
ABDOMINAL PAIN: 0
SHORTNESS OF BREATH: 0
DIARRHEA: 0

## 2023-10-19 NOTE — PATIENT INSTRUCTIONS
- please purchase Delsym (orange box) over the counter for your cough  - please call the clinic if your cough gets worse or if you have a fever or if the sputum color changes   - please moisturize your elbows, you could use Vaseline   - please continue stretch and exercise 5 days a week   - please continue to eat healthy with more vegetables and fruits  - please return to clinic in 3 months

## 2024-01-08 RX ORDER — VALSARTAN 160 MG/1
160 TABLET ORAL DAILY
Qty: 30 TABLET | Refills: 2 | Status: SHIPPED | OUTPATIENT
Start: 2024-01-08 | End: 2024-01-31 | Stop reason: SDUPTHER

## 2024-01-08 NOTE — TELEPHONE ENCOUNTER
Requested Prescriptions     Pending Prescriptions Disp Refills    valsartan (DIOVAN) 160 MG tablet [Pharmacy Med Name: Valsartan 160 MG Oral Tablet] 30 tablet 0     Sig: Take 1 tablet by mouth once daily       Last Clinic Visit:  10/19/2023     Next Clinic Appointment:  1/31/2024

## 2024-01-31 ENCOUNTER — OFFICE VISIT (OUTPATIENT)
Dept: INTERNAL MEDICINE CLINIC | Age: 59
End: 2024-01-31

## 2024-01-31 VITALS
TEMPERATURE: 97.4 F | DIASTOLIC BLOOD PRESSURE: 77 MMHG | OXYGEN SATURATION: 97 % | HEIGHT: 57 IN | SYSTOLIC BLOOD PRESSURE: 128 MMHG | HEART RATE: 67 BPM | WEIGHT: 213.4 LBS | BODY MASS INDEX: 46.04 KG/M2 | RESPIRATION RATE: 18 BRPM

## 2024-01-31 DIAGNOSIS — G89.29 CHRONIC PAIN OF BOTH ANKLES: ICD-10-CM

## 2024-01-31 DIAGNOSIS — M25.571 CHRONIC PAIN OF BOTH ANKLES: ICD-10-CM

## 2024-01-31 DIAGNOSIS — I10 PRIMARY HYPERTENSION: Primary | ICD-10-CM

## 2024-01-31 DIAGNOSIS — M25.572 CHRONIC PAIN OF BOTH ANKLES: ICD-10-CM

## 2024-01-31 PROCEDURE — 99213 OFFICE O/P EST LOW 20 MIN: CPT

## 2024-01-31 RX ORDER — MELOXICAM 7.5 MG/1
7.5 TABLET ORAL DAILY PRN
Qty: 14 TABLET | Refills: 0 | Status: SHIPPED | OUTPATIENT
Start: 2024-01-31 | End: 2024-02-14

## 2024-01-31 RX ORDER — VALSARTAN 160 MG/1
160 TABLET ORAL DAILY
Qty: 30 TABLET | Refills: 2 | Status: SHIPPED | OUTPATIENT
Start: 2024-01-31

## 2024-01-31 ASSESSMENT — PATIENT HEALTH QUESTIONNAIRE - PHQ9
1. LITTLE INTEREST OR PLEASURE IN DOING THINGS: 0
2. FEELING DOWN, DEPRESSED OR HOPELESS: 0
SUM OF ALL RESPONSES TO PHQ9 QUESTIONS 1 & 2: 0
SUM OF ALL RESPONSES TO PHQ QUESTIONS 1-9: 0

## 2024-01-31 ASSESSMENT — ENCOUNTER SYMPTOMS
VOMITING: 0
CHEST TIGHTNESS: 0
NAUSEA: 0
COUGH: 0
CONSTIPATION: 0
ABDOMINAL PAIN: 0
DIARRHEA: 0
WHEEZING: 0

## 2024-01-31 NOTE — PATIENT INSTRUCTIONS
- please continue stretch and exercise 5 days a week   - please cut down on the carbs and sugary food  - please continue to eat healthy with more vegetables and fruits

## 2024-01-31 NOTE — PROGRESS NOTES
[facility]      HPI    Marina Love is a 58 y.o. female, with a PMHx of HTN, prediabetes, chronic ankle pain presents for a routine follow up.    HTN  She is compliant with her medications. Denies any side effects. She checks her BP at home regularly. -140s at home. She hasn't been walking as it is cold outside but tries to workout at home. She has been trying to eat healthy.     chronic ankle pain  Pain is better with the recommended exercise and stretches. She rates it 4/10 now. Pain occurs usually after long walks and after she gets up from sitting down. She takes Meloxicam once a week; ran out recently so she has been taking tylenol.     Patient states that she got her mammogram done in June 2023 at [facility] and asking for results but there are no results in the system.       Review of Systems   Constitutional:  Negative for activity change, appetite change, fatigue and fever.   Respiratory:  Negative for cough, chest tightness and wheezing.    Cardiovascular:  Negative for chest pain, palpitations and leg swelling.   Gastrointestinal:  Negative for abdominal pain, constipation, diarrhea, nausea and vomiting.   Neurological:  Negative for light-headedness and headaches.       MEDICATIONS:  Prior to Visit Medications    Medication Sig Taking? Authorizing Provider   meloxicam (MOBIC) 7.5 MG tablet Take 1 tablet by mouth daily as needed for Pain Yes Neris Duran MD   valsartan (DIOVAN) 160 MG tablet Take 1 tablet by mouth daily Yes Nersi Duran MD   diclofenac sodium (VOLTAREN) 1 % GEL Apply 4 g topically 4 times daily Yes Neris Duran MD        Vitals:    01/31/24 0917   BP: 128/77   Site: Left Upper Arm   Position: Sitting   Cuff Size: Large Adult   Pulse: 67   Resp: 18   Temp: 97.4 °F (36.3 °C)   TempSrc: Temporal   SpO2: 97%   Weight: 96.8 kg (213 lb 6.4 oz)   Height: 1.448 m (4' 9\")      Estimated body mass index is 46.18 kg/m² as

## 2024-04-29 ENCOUNTER — OFFICE VISIT (OUTPATIENT)
Dept: INTERNAL MEDICINE CLINIC | Age: 59
End: 2024-04-29

## 2024-04-29 VITALS
BODY MASS INDEX: 46.17 KG/M2 | OXYGEN SATURATION: 97 % | HEIGHT: 57 IN | HEART RATE: 82 BPM | WEIGHT: 214 LBS | RESPIRATION RATE: 16 BRPM | SYSTOLIC BLOOD PRESSURE: 145 MMHG | TEMPERATURE: 98.1 F | DIASTOLIC BLOOD PRESSURE: 79 MMHG

## 2024-04-29 DIAGNOSIS — M25.572 CHRONIC PAIN OF LEFT ANKLE: ICD-10-CM

## 2024-04-29 DIAGNOSIS — M25.572 CHRONIC PAIN OF BOTH ANKLES: ICD-10-CM

## 2024-04-29 DIAGNOSIS — I10 PRIMARY HYPERTENSION: ICD-10-CM

## 2024-04-29 DIAGNOSIS — G89.29 CHRONIC PAIN OF LEFT ANKLE: ICD-10-CM

## 2024-04-29 DIAGNOSIS — Z23 NEED FOR TDAP VACCINATION: Primary | ICD-10-CM

## 2024-04-29 DIAGNOSIS — Z00.00 HEALTHCARE MAINTENANCE: ICD-10-CM

## 2024-04-29 DIAGNOSIS — E66.01 CLASS 3 SEVERE OBESITY DUE TO EXCESS CALORIES WITH BODY MASS INDEX (BMI) OF 45.0 TO 49.9 IN ADULT, UNSPECIFIED WHETHER SERIOUS COMORBIDITY PRESENT (HCC): ICD-10-CM

## 2024-04-29 DIAGNOSIS — G89.29 CHRONIC PAIN OF BOTH ANKLES: ICD-10-CM

## 2024-04-29 DIAGNOSIS — R73.03 PREDIABETES: ICD-10-CM

## 2024-04-29 DIAGNOSIS — M25.571 CHRONIC PAIN OF BOTH ANKLES: ICD-10-CM

## 2024-04-29 PROBLEM — Z76.89 ENCOUNTER TO ESTABLISH CARE: Status: RESOLVED | Noted: 2022-12-08 | Resolved: 2024-04-29

## 2024-04-29 LAB
ALBUMIN SERPL-MCNC: 4.3 G/DL (ref 3.4–5)
ALBUMIN/GLOB SERPL: 1.7 {RATIO} (ref 1.1–2.2)
ALP SERPL-CCNC: 74 U/L (ref 40–129)
ALT SERPL-CCNC: 16 U/L (ref 10–40)
ANION GAP SERPL CALCULATED.3IONS-SCNC: 10 MMOL/L (ref 3–16)
AST SERPL-CCNC: 16 U/L (ref 15–37)
BASOPHILS # BLD: 0 K/UL (ref 0–0.2)
BASOPHILS NFR BLD: 0.6 %
BILIRUB SERPL-MCNC: 0.3 MG/DL (ref 0–1)
BUN SERPL-MCNC: 10 MG/DL (ref 7–20)
CALCIUM SERPL-MCNC: 9.1 MG/DL (ref 8.3–10.6)
CHLORIDE SERPL-SCNC: 108 MMOL/L (ref 99–110)
CHOLEST SERPL-MCNC: 155 MG/DL (ref 0–199)
CO2 SERPL-SCNC: 25 MMOL/L (ref 21–32)
CREAT SERPL-MCNC: <0.5 MG/DL (ref 0.6–1.1)
DEPRECATED RDW RBC AUTO: 14.4 % (ref 12.4–15.4)
EOSINOPHIL # BLD: 0.1 K/UL (ref 0–0.6)
EOSINOPHIL NFR BLD: 2.4 %
GFR SERPLBLD CREATININE-BSD FMLA CKD-EPI: >90 ML/MIN/{1.73_M2}
GLUCOSE SERPL-MCNC: 100 MG/DL (ref 70–99)
HCT VFR BLD AUTO: 35.3 % (ref 36–48)
HDLC SERPL-MCNC: 55 MG/DL (ref 40–60)
HGB BLD-MCNC: 11.9 G/DL (ref 12–16)
LDLC SERPL CALC-MCNC: 79 MG/DL
LYMPHOCYTES # BLD: 1.4 K/UL (ref 1–5.1)
LYMPHOCYTES NFR BLD: 26.6 %
MCH RBC QN AUTO: 29.3 PG (ref 26–34)
MCHC RBC AUTO-ENTMCNC: 33.6 G/DL (ref 31–36)
MCV RBC AUTO: 87.4 FL (ref 80–100)
MONOCYTES # BLD: 0.4 K/UL (ref 0–1.3)
MONOCYTES NFR BLD: 7.5 %
NEUTROPHILS # BLD: 3.2 K/UL (ref 1.7–7.7)
NEUTROPHILS NFR BLD: 62.9 %
PLATELET # BLD AUTO: 249 K/UL (ref 135–450)
PMV BLD AUTO: 9.5 FL (ref 5–10.5)
POTASSIUM SERPL-SCNC: 4.1 MMOL/L (ref 3.5–5.1)
PROT SERPL-MCNC: 6.9 G/DL (ref 6.4–8.2)
RBC # BLD AUTO: 4.04 M/UL (ref 4–5.2)
SODIUM SERPL-SCNC: 143 MMOL/L (ref 136–145)
TRIGL SERPL-MCNC: 103 MG/DL (ref 0–150)
VLDLC SERPL CALC-MCNC: 21 MG/DL
WBC # BLD AUTO: 5.1 K/UL (ref 4–11)

## 2024-04-29 PROCEDURE — 6360000002 HC RX W HCPCS

## 2024-04-29 PROCEDURE — 90471 IMMUNIZATION ADMIN: CPT

## 2024-04-29 PROCEDURE — 99213 OFFICE O/P EST LOW 20 MIN: CPT

## 2024-04-29 PROCEDURE — 90715 TDAP VACCINE 7 YRS/> IM: CPT

## 2024-04-29 RX ORDER — ACETAMINOPHEN 500 MG
500 TABLET ORAL EVERY 6 HOURS PRN
COMMUNITY

## 2024-04-29 RX ADMIN — TETANUS TOXOID, REDUCED DIPHTHERIA TOXOID AND ACELLULAR PERTUSSIS VACCINE, ADSORBED 0.5 ML: 5; 2.5; 8; 8; 2.5 SUSPENSION INTRAMUSCULAR at 10:32

## 2024-04-29 ASSESSMENT — ENCOUNTER SYMPTOMS
ABDOMINAL DISTENTION: 0
CHEST TIGHTNESS: 0
ABDOMINAL PAIN: 0
CHOKING: 0
SHORTNESS OF BREATH: 0
COUGH: 0
BACK PAIN: 0
APNEA: 0

## 2024-04-29 NOTE — ASSESSMENT & PLAN NOTE
Uncontrolled, continue current medications and lifestyle modifications recommended. We discussed that she would implement the changes before considering adding another medications. Her trend shows normal or close to normal BP in the week days and high numbers on the weekends. She understands that she would require a steady state adherence to a good and low salt diet to keep her numbers in check. Will consider adding a CCB or thiazide diuretic on the next visit if the BP persists to be high.

## 2024-04-29 NOTE — ASSESSMENT & PLAN NOTE
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  Completed PT following surgery with great improvement  Will give a referral to orthopedic surgery for a follow up since the left ankle pain is getting worse. No signs of acute infection at this time.

## 2024-04-29 NOTE — PATIENT INSTRUCTIONS
- Please take your medications as prescribed.   - Please see a orthopedic surgeon for your foot.   - Please f/u in 5 weeks. Implement the dietary and lifestyle modifications we talked about and continue to check your blood pressure at home.

## 2024-04-29 NOTE — PROGRESS NOTES
[facility]    Marina Love is a 59 y.o. female, here for evaluation of the following concerns:    Hypertension  Pertinent negatives include no chest pain, headaches, neck pain, palpitations or shortness of breath.     The patient is a 59 y. o F who presents to the clinic for following concenrs:    HTN:   Uncontrolled on Diovan. Looking at the trend charted at home. Her blood pressures are normal during week days and high Bps are noted on the weekends where she is not adhering to a good diet. Reports compliance with medications.     Left Ankle Pain:  She has history of septic arthritis requiring arthrotomy with I&D as well as IV Abx in 2019. The pain described seems like a non inflammatory arthritic pain. Arthritic changes noted on the joints of the hands as well. The pain is persistent and gets worse on standing and towards the end of the day. Swelling of the joint noted but no erythema or effusion noted, no difficulty with active or passive ROM.     Health Care maintenance:  She is due for cervical cancer screening, screening colonoscopy, as  well as HIV, HCV lab work.   She is self pay at this time and finances are an issue.     Review of Systems   Constitutional:  Positive for activity change. Negative for appetite change, chills, diaphoresis, fatigue and fever.   HENT:  Negative for congestion.    Respiratory:  Negative for apnea, cough, choking, chest tightness and shortness of breath.    Cardiovascular:  Negative for chest pain, palpitations and leg swelling.   Gastrointestinal:  Negative for abdominal distention and abdominal pain.   Genitourinary:  Negative for difficulty urinating, dyspareunia and dysuria.   Musculoskeletal:  Positive for arthralgias and joint swelling. Negative for back pain, gait problem, myalgias, neck pain and neck stiffness.        Left ankle swelling, arthritic changes noted on the hands.    Neurological:  Negative for dizziness, facial

## 2024-04-30 LAB
HCV AB SERPL QL IA: NORMAL
HIV 1+2 AB+HIV1 P24 AG SERPL QL IA: NORMAL
HIV 2 AB SERPL QL IA: NORMAL
HIV1 AB SERPL QL IA: NORMAL
HIV1 P24 AG SERPL QL IA: NORMAL

## 2024-04-30 NOTE — RESULT ENCOUNTER NOTE
Tried to reach this patient with a   Mirian [id number] but patient was unavailable and there was no voice mail set up to leave a message. Will try again later.

## 2024-04-30 NOTE — RESULT ENCOUNTER NOTE
Tried to call this patient using a  Mirian #[id number]. Patient was not available and there was no voice mail set up for me to leave a message. Can try again later.

## 2024-05-17 SDOH — HEALTH STABILITY: PHYSICAL HEALTH: ON AVERAGE, HOW MANY DAYS PER WEEK DO YOU ENGAGE IN MODERATE TO STRENUOUS EXERCISE (LIKE A BRISK WALK)?: 0 DAYS

## 2024-05-22 ENCOUNTER — OFFICE VISIT (OUTPATIENT)
Dept: ORTHOPEDIC SURGERY | Age: 59
End: 2024-05-22

## 2024-05-22 VITALS — WEIGHT: 213 LBS | HEIGHT: 56 IN | BODY MASS INDEX: 47.92 KG/M2

## 2024-05-22 DIAGNOSIS — M25.572 CHRONIC PAIN OF LEFT ANKLE: Chronic | ICD-10-CM

## 2024-05-22 DIAGNOSIS — M19.072 ARTHRITIS OF LEFT ANKLE: Primary | ICD-10-CM

## 2024-05-22 DIAGNOSIS — G89.29 CHRONIC PAIN OF LEFT ANKLE: Chronic | ICD-10-CM

## 2024-05-22 PROCEDURE — 99203 OFFICE O/P NEW LOW 30 MIN: CPT | Performed by: ORTHOPAEDIC SURGERY

## 2024-05-22 RX ORDER — NAPROXEN 500 MG/1
500 TABLET ORAL 2 TIMES DAILY WITH MEALS
Qty: 60 TABLET | Refills: 0 | Status: SHIPPED | OUTPATIENT
Start: 2024-05-22 | End: 2024-06-21

## 2024-05-22 SDOH — HEALTH STABILITY: PHYSICAL HEALTH: ON AVERAGE, HOW MANY DAYS PER WEEK DO YOU ENGAGE IN MODERATE TO STRENUOUS EXERCISE (LIKE A BRISK WALK)?: 0 DAYS

## 2024-05-22 NOTE — PROGRESS NOTES
CHIEF COMPLAINT: Left ankle pain/ arthritis.    HISTORY:  Ms. Love 59 y.o.  female presents today for the first visit for evaluation of left ankle pain which started  when she had septic ankle,s/pI&D 10/24/2019. The pain got worse in 2024.  She is complaining of sharp pain, 7/10. Alleviating factors:  rest. Pain is increase with standing and walking and shoe wear.Pain is sharp with first few steps, dull achy pain by the end of the day. No radiation and no numbness and tingling sensation. No other complaint. There is no history of ankle injury.     Past Medical History:   Diagnosis Date    Encounter to [facility] 2022    MSSA (methicillin susceptible Staphylococcus aureus) infection        Past Surgical History:   Procedure Laterality Date    ANKLE SURGERY Left 10/24/2019    LEFT ANKLE INCISION AND DRAINAGE OF OSTEOMYELITIS AND ARTHROTOMY OF LEFT ANKLE performed by Sloane Narvaez MD at [facility] OR     SECTION         Social History     Socioeconomic History    Marital status:      Spouse name: Not on file    Number of children: Not on file    Years of education: Not on file    Highest education level: Not on file   Occupational History    Not on file   Tobacco Use    Smoking status: Never    Smokeless tobacco: Never   Vaping Use    Vaping Use: Not on file   Substance and Sexual Activity    Alcohol use: No    Drug use: No    Sexual activity: Not on file   Other Topics Concern    Not on file   Social History Narrative    Not on file     Social Determinants of Health     Financial Resource Strain: High Risk (2023)    Overall Financial Resource Strain (CARDIA)     Difficulty of Paying Living Expenses: Very hard   Food Insecurity: Not on file (2023)   Recent Concern: Food Insecurity - Food Insecurity Present (2023)    Hunger Vital Sign     Worried About Running Out of Food in the Last Year: Often true     Ran Out of Food in the Last Year: Sometimes true

## 2024-05-29 PROBLEM — Z00.00 HEALTHCARE MAINTENANCE: Status: RESOLVED | Noted: 2022-12-08 | Resolved: 2024-05-29

## 2024-06-03 ENCOUNTER — OFFICE VISIT (OUTPATIENT)
Dept: INTERNAL MEDICINE CLINIC | Age: 59
End: 2024-06-03

## 2024-06-03 VITALS
TEMPERATURE: 97.2 F | HEART RATE: 69 BPM | DIASTOLIC BLOOD PRESSURE: 82 MMHG | SYSTOLIC BLOOD PRESSURE: 133 MMHG | OXYGEN SATURATION: 98 % | WEIGHT: 215 LBS | RESPIRATION RATE: 16 BRPM | HEIGHT: 57 IN | BODY MASS INDEX: 46.38 KG/M2

## 2024-06-03 DIAGNOSIS — G89.29 CHRONIC PAIN OF LEFT ANKLE: Chronic | ICD-10-CM

## 2024-06-03 DIAGNOSIS — R73.03 PREDIABETES: ICD-10-CM

## 2024-06-03 DIAGNOSIS — I10 PRIMARY HYPERTENSION: Primary | ICD-10-CM

## 2024-06-03 DIAGNOSIS — M25.572 CHRONIC PAIN OF LEFT ANKLE: Chronic | ICD-10-CM

## 2024-06-03 LAB — HBA1C MFR BLD: 6 %

## 2024-06-03 PROCEDURE — 83036 HEMOGLOBIN GLYCOSYLATED A1C: CPT

## 2024-06-03 PROCEDURE — 99213 OFFICE O/P EST LOW 20 MIN: CPT

## 2024-06-03 RX ORDER — VALSARTAN AND HYDROCHLOROTHIAZIDE 160; 12.5 MG/1; MG/1
1 TABLET, FILM COATED ORAL DAILY
Qty: 30 TABLET | Refills: 1 | Status: SHIPPED | OUTPATIENT
Start: 2024-06-03

## 2024-06-03 ASSESSMENT — ENCOUNTER SYMPTOMS
ABDOMINAL PAIN: 0
COLOR CHANGE: 0
SHORTNESS OF BREATH: 0
COUGH: 0

## 2024-06-03 NOTE — PROGRESS NOTES
# [id number]   Francie  
97.2 °F (36.2 °C)    TempSrc: Temporal    SpO2: 98%    Weight: 97.5 kg (215 lb)    Height: 1.435 m (4' 8.5\")       Estimated body mass index is 47.35 kg/m² as calculated from the following:    Height as of this encounter: 1.435 m (4' 8.5\").    Weight as of this encounter: 97.5 kg (215 lb).  Physical Exam  Constitutional:       General: She is not in acute distress.     Appearance: Normal appearance. She is not toxic-appearing.   HENT:      Head: Normocephalic and atraumatic.   Eyes:      Extraocular Movements: Extraocular movements intact.   Cardiovascular:      Rate and Rhythm: Normal rate and regular rhythm.      Pulses: Normal pulses.      Heart sounds: Normal heart sounds.   Pulmonary:      Effort: Pulmonary effort is normal. No respiratory distress.      Breath sounds: Normal breath sounds. No wheezing, rhonchi or rales.   Abdominal:      General: Abdomen is flat. Bowel sounds are normal. There is no distension.      Palpations: Abdomen is soft.      Tenderness: There is no abdominal tenderness.   Musculoskeletal:         General: Normal range of motion.      Cervical back: Normal range of motion.   Skin:     General: Skin is warm and dry.   Neurological:      General: No focal deficit present.      Mental Status: She is alert and oriented to person, place, and time.   Psychiatric:         Mood and Affect: Mood normal.         ASSESSMENT/PLAN:     1. Primary hypertension  - Patient presents for check up on her blood pressure control. She is currently on Valsartan 160mg QD. She does check her blood pressures at home twice daily. Several readings showing systolics in the 140-160s, few in the 130s. She is compliant with her medication  - Will change Valsartan to combination Valsartan/HCTZ 160-12.5mg QD. Advised to continue checking her blood pressures daily and will reassess control of her BP at next visit in 5 weeks.   - Check BMP in 2-4 weeks, prior to next visit to assess kidney/electrolytes on new HCTZ  -

## 2024-06-03 NOTE — PATIENT INSTRUCTIONS
Estamos cambiando cohn medicamento para la presión arterial por zack pastilla combinada. Mily nuevo medicamento es Valsartán/Hidroclorotiazida 160-12,5 mg. Hanska reemplaza cohn medicamento anterior, no los tome juntos. [place]arrabee mily nuevo medicamento zack vez al día.     Continúe controlando cohn presión arterial en casa con mily nuevo medicamento. También estamos solicitando algunos análisis de bora para controlar rene electrolitos, ya que el nuevo medicamento puede hacer que disminuyan. Realice mily BMP en 2 a 4 semanas.     Cohn A1C es 6,0, lo que todavía es prediabético. Recomendamos seguir intentando perder peso y llevar zack dieta baja en carbohidratos. También puede probar zack cucharadita de shirley zack vez al día junto con suplementos de vitamina D para ayudar a mantener bajos los niveles de azúcar y A1C.     El otro número que pude encontrar del Dr. Alvarez es [phone]. No estoy seguro de si mily número funcionará y no estoy seguro de por qué el otro, que es exacto, no funciona.     Regrese a la clínica en 5 semanas para controlar cohn presión arterial.      We are changing your blood pressure medication to a combination pill. This new medication is Valsartan/Hydrochlorothiazide 160-12.5mg. This is replacing your old medication, do not take them together. Please take this new medication once per day.     Continue checking your blood pressures at home on this new medication. We are also ordering some blood work to check on your electrolytes since the new medication can cause them to decrease. Please have this BMP done in 2-4 weeks.     Your A1C is 6.0, which is still prediabetic. We would recommend continuing to try to lose weight, eat low carb diet. You can also try a teaspoon of cinnamon once per day along with vitamin D supplements to help keep your sugars and A1C down.     The other number I was able to find for Dr Alvarez is [phone]. I'm not sure if this number will work and I'm not sure why the other one, which is

## 2024-06-15 DIAGNOSIS — M19.072 ARTHRITIS OF LEFT ANKLE: ICD-10-CM

## 2024-06-17 RX ORDER — NAPROXEN 500 MG/1
500 TABLET ORAL 2 TIMES DAILY WITH MEALS
Qty: 60 TABLET | Refills: 0 | Status: SHIPPED | OUTPATIENT
Start: 2024-06-17

## 2024-06-17 NOTE — TELEPHONE ENCOUNTER
Patient last seen 5/22/24 and medication last filled 5/22/24:    Requested Prescriptions     Pending Prescriptions Disp Refills    naproxen (NAPROSYN) 500 MG tablet [Pharmacy Med Name: Naproxen 500 MG Oral Tablet] 60 tablet 0     Sig: TAKE 1 TABLET BY MOUTH TWICE DAILY WITH MEALS

## 2024-07-02 ENCOUNTER — HOSPITAL ENCOUNTER (OUTPATIENT)
Age: 59
Discharge: HOME | End: 2024-07-02

## 2024-07-02 DIAGNOSIS — I10 PRIMARY HYPERTENSION: ICD-10-CM

## 2024-07-02 LAB
ANION GAP SERPL CALCULATED.3IONS-SCNC: 11 MMOL/L (ref 3–16)
BUN SERPL-MCNC: 19 MG/DL (ref 7–20)
CALCIUM SERPL-MCNC: 9.3 MG/DL (ref 8.3–10.6)
CHLORIDE SERPL-SCNC: 104 MMOL/L (ref 99–110)
CO2 SERPL-SCNC: 27 MMOL/L (ref 21–32)
CREAT SERPL-MCNC: <0.5 MG/DL (ref 0.6–1.1)
GFR SERPLBLD CREATININE-BSD FMLA CKD-EPI: >90 ML/MIN/{1.73_M2}
GLUCOSE SERPL-MCNC: 111 MG/DL (ref 70–99)
POTASSIUM SERPL-SCNC: 3.8 MMOL/L (ref 3.5–5.1)
SODIUM SERPL-SCNC: 142 MMOL/L (ref 136–145)

## 2024-07-02 PROCEDURE — 36415 COLL VENOUS BLD VENIPUNCTURE: CPT

## 2024-07-02 PROCEDURE — 80048 BASIC METABOLIC PNL TOTAL CA: CPT

## 2024-07-10 SDOH — ECONOMIC STABILITY: INCOME INSECURITY: HOW HARD IS IT FOR YOU TO PAY FOR THE VERY BASICS LIKE FOOD, HOUSING, MEDICAL CARE, AND HEATING?: VERY HARD

## 2024-07-10 SDOH — ECONOMIC STABILITY: FOOD INSECURITY: WITHIN THE PAST 12 MONTHS, THE FOOD YOU BOUGHT JUST DIDN'T LAST AND YOU DIDN'T HAVE MONEY TO GET MORE.: SOMETIMES TRUE

## 2024-07-10 SDOH — ECONOMIC STABILITY: FOOD INSECURITY: WITHIN THE PAST 12 MONTHS, YOU WORRIED THAT YOUR FOOD WOULD RUN OUT BEFORE YOU GOT MONEY TO BUY MORE.: OFTEN TRUE

## 2024-07-11 ENCOUNTER — OFFICE VISIT (OUTPATIENT)
Dept: INTERNAL MEDICINE CLINIC | Age: 59
End: 2024-07-11

## 2024-07-11 VITALS
OXYGEN SATURATION: 96 % | RESPIRATION RATE: 20 BRPM | HEART RATE: 81 BPM | TEMPERATURE: 97.4 F | SYSTOLIC BLOOD PRESSURE: 123 MMHG | DIASTOLIC BLOOD PRESSURE: 79 MMHG | HEIGHT: 57 IN | BODY MASS INDEX: 45.46 KG/M2 | WEIGHT: 210.7 LBS

## 2024-07-11 DIAGNOSIS — G89.29 CHRONIC PAIN OF LEFT ANKLE: Chronic | ICD-10-CM

## 2024-07-11 DIAGNOSIS — M25.572 CHRONIC PAIN OF LEFT ANKLE: Chronic | ICD-10-CM

## 2024-07-11 DIAGNOSIS — I10 PRIMARY HYPERTENSION: Primary | ICD-10-CM

## 2024-07-11 DIAGNOSIS — M19.072 ARTHRITIS OF LEFT ANKLE: ICD-10-CM

## 2024-07-11 DIAGNOSIS — R73.03 PREDIABETES: ICD-10-CM

## 2024-07-11 PROBLEM — N89.8 VAGINAL ITCHING: Status: RESOLVED | Noted: 2017-05-23 | Resolved: 2024-07-11

## 2024-07-11 PROCEDURE — 99213 OFFICE O/P EST LOW 20 MIN: CPT

## 2024-07-11 ASSESSMENT — ENCOUNTER SYMPTOMS
RESPIRATORY NEGATIVE: 1
EYES NEGATIVE: 1
ALLERGIC/IMMUNOLOGIC NEGATIVE: 1
GASTROINTESTINAL NEGATIVE: 1

## 2024-07-11 NOTE — PROGRESS NOTES
tablet by mouth every 6 hours as needed for Pain Yes Provider, MD Annette   diclofenac sodium (VOLTAREN) 1 % GEL Apply 4 g topically 4 times daily Yes Neris Duran MD        Vitals:    07/11/24 0939   BP: 123/79   Site: Right Upper Arm   Position: Sitting   Cuff Size: Medium Adult   Pulse: 81   Resp: 20   Temp: 97.4 °F (36.3 °C)   TempSrc: Temporal   SpO2: 96%   Weight: 95.6 kg (210 lb 11.2 oz)   Height: 1.448 m (4' 9\")      Estimated body mass index is 45.6 kg/m² as calculated from the following:    Height as of this encounter: 1.448 m (4' 9\").    Weight as of this encounter: 95.6 kg (210 lb 11.2 oz).  Physical Exam  Constitutional:       Appearance: Normal appearance. She is obese.   HENT:      Head: Normocephalic and atraumatic.      Nose: Nose normal.      Mouth/Throat:      Mouth: Mucous membranes are moist.      Pharynx: Oropharynx is clear.   Eyes:      Extraocular Movements: Extraocular movements intact.      Conjunctiva/sclera: Conjunctivae normal.   Cardiovascular:      Rate and Rhythm: Normal rate and regular rhythm.      Pulses: Normal pulses.      Heart sounds: Normal heart sounds.   Pulmonary:      Effort: Pulmonary effort is normal.      Breath sounds: Normal breath sounds.   Abdominal:      General: Bowel sounds are normal.      Palpations: Abdomen is soft.   Musculoskeletal:         General: Normal range of motion.      Cervical back: Normal range of motion.   Skin:     General: Skin is warm.      Capillary Refill: Capillary refill takes less than 2 seconds.   Neurological:      General: No focal deficit present.      Mental Status: She is alert and oriented to person, place, and time. Mental status is at baseline.   Psychiatric:         Mood and Affect: Mood normal.         Behavior: Behavior normal.         ASSESSMENT/PLAN:   Marina VORA is a 59 y.o. female with PMH of septic arthiritis of left ankle (2019), prediabetes and HTN that presented to the clinic for follow-up of high blood

## 2024-07-11 NOTE — PATIENT INSTRUCTIONS
Thank you for coming in for your appointment today, it was a pleasure to meet you. Below is a summary of our visit:     Please limit the amount of Sodium you eat: avoid using a salt shaker and do not packaged foods with a lot of added salt (more than 5% daily value)  Measure your blood pressure daily, and bring your home cuff to your next appointment  Continue taking your blood pressure medication daily. Contact the clinic if there is an issue with cost.   We will see you back in the clinic in 4 weeks to review your blood pressure

## 2024-07-16 DIAGNOSIS — M19.072 ARTHRITIS OF LEFT ANKLE: ICD-10-CM

## 2024-07-17 RX ORDER — NAPROXEN 500 MG/1
500 TABLET ORAL 2 TIMES DAILY WITH MEALS
Qty: 60 TABLET | Refills: 0 | Status: SHIPPED | OUTPATIENT
Start: 2024-07-17

## 2024-07-17 NOTE — TELEPHONE ENCOUNTER
Patient last seen 5/22/24 and medication last filled 6/17/24:    Requested Prescriptions     Pending Prescriptions Disp Refills    naproxen (NAPROSYN) 500 MG tablet [Pharmacy Med Name: Naproxen 500 MG Oral Tablet] 60 tablet 0     Sig: TAKE 1 TABLET BY MOUTH TWICE DAILY WITH MEALS

## 2024-07-26 RX ORDER — VALSARTAN AND HYDROCHLOROTHIAZIDE 160; 12.5 MG/1; MG/1
1 TABLET, FILM COATED ORAL DAILY
Qty: 30 TABLET | Refills: 0 | Status: SHIPPED | OUTPATIENT
Start: 2024-07-26 | End: 2024-08-28

## 2024-08-13 DIAGNOSIS — M19.072 ARTHRITIS OF LEFT ANKLE: ICD-10-CM

## 2024-08-13 RX ORDER — NAPROXEN 500 MG/1
500 TABLET ORAL 2 TIMES DAILY WITH MEALS
Qty: 60 TABLET | Refills: 0 | Status: SHIPPED | OUTPATIENT
Start: 2024-08-13

## 2024-08-13 NOTE — TELEPHONE ENCOUNTER
Patient last seen 5/22/24 and medication last filled 7/17/24:    Requested Prescriptions     Pending Prescriptions Disp Refills    naproxen (NAPROSYN) 500 MG tablet [Pharmacy Med Name: Naproxen 500 MG Oral Tablet] 60 tablet 0     Sig: TAKE 1 TABLET BY MOUTH TWICE DAILY WITH MEALS

## 2024-08-28 RX ORDER — VALSARTAN AND HYDROCHLOROTHIAZIDE 160; 12.5 MG/1; MG/1
1 TABLET, FILM COATED ORAL DAILY
Qty: 30 TABLET | Refills: 0 | Status: SHIPPED | OUTPATIENT
Start: 2024-08-28

## 2024-08-28 NOTE — TELEPHONE ENCOUNTER
Requested Prescriptions     Pending Prescriptions Disp Refills    valsartan-hydroCHLOROthiazide (DIOVAN-HCT) 160-12.5 MG per tablet [Pharmacy Med Name: Valsartan-hydroCHLOROthiazide 160-12.5 MG Oral Tablet] 30 tablet 0     Sig: Take 1 tablet by mouth once daily       Last Clinic Visit:  7/11/2024     Next Clinic Appointment:  9/5/2024

## 2024-09-05 ENCOUNTER — OFFICE VISIT (OUTPATIENT)
Dept: INTERNAL MEDICINE CLINIC | Age: 59
End: 2024-09-05

## 2024-09-05 VITALS
OXYGEN SATURATION: 99 % | BODY MASS INDEX: 45.93 KG/M2 | SYSTOLIC BLOOD PRESSURE: 141 MMHG | DIASTOLIC BLOOD PRESSURE: 84 MMHG | HEART RATE: 82 BPM | RESPIRATION RATE: 16 BRPM | HEIGHT: 57 IN | WEIGHT: 212.9 LBS | TEMPERATURE: 97.2 F

## 2024-09-05 DIAGNOSIS — R73.03 PREDIABETES: ICD-10-CM

## 2024-09-05 DIAGNOSIS — M25.571 CHRONIC PAIN OF BOTH ANKLES: Chronic | ICD-10-CM

## 2024-09-05 DIAGNOSIS — G89.29 CHRONIC PAIN OF BOTH ANKLES: Chronic | ICD-10-CM

## 2024-09-05 DIAGNOSIS — M25.572 CHRONIC PAIN OF BOTH ANKLES: Chronic | ICD-10-CM

## 2024-09-05 DIAGNOSIS — I10 PRIMARY HYPERTENSION: Primary | ICD-10-CM

## 2024-09-05 PROCEDURE — 99213 OFFICE O/P EST LOW 20 MIN: CPT

## 2024-09-05 RX ORDER — VALSARTAN AND HYDROCHLOROTHIAZIDE 160; 12.5 MG/1; MG/1
1 TABLET, FILM COATED ORAL DAILY
Qty: 90 TABLET | Refills: 1 | Status: SHIPPED | OUTPATIENT
Start: 2024-09-05

## 2024-09-05 ASSESSMENT — ENCOUNTER SYMPTOMS
ABDOMINAL DISTENTION: 0
NAUSEA: 0
SHORTNESS OF BREATH: 0
BACK PAIN: 0
WHEEZING: 0
ALLERGIC/IMMUNOLOGIC NEGATIVE: 1
COUGH: 0
EYES NEGATIVE: 1
VOMITING: 0

## 2024-09-05 NOTE — PATIENT INSTRUCTIONS
Nice seeing you today!     Continue taking your medication and monitor your blood pressure at home.     We will see you back here in two months!    If you have any worsening symptoms, please contact us.

## 2024-09-05 NOTE — PROGRESS NOTES
management .    The patient was staffed with teaching attending: Dr. Giuseppe Calvo.    An electronic signature was used to authenticate this note.    --April Alanis MD

## 2024-09-05 NOTE — ASSESSMENT & PLAN NOTE
Patient has been compliant with her medications. However it is difficult to receive medications due to cost. She is now accepted to [facility], will continue refilling medications through there. She brought her BP log, highest BP was systolic of 152. The last month her BP seemed stable with systolic of 130s. Will continue to monitor her on diovan-HCT.   - low salt diet & exercise  - continue diovan- -12.5 mg daily   - log BP daily  - will follow up at [facility]

## 2024-09-05 NOTE — ASSESSMENT & PLAN NOTE
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   Secondary to arthritis and obesity, we talked about lifestyle and dietary modifications.   - continue tylenol & voltaren gel

## 2024-09-12 DIAGNOSIS — M19.072 ARTHRITIS OF LEFT ANKLE: ICD-10-CM

## 2024-09-13 RX ORDER — NAPROXEN 500 MG/1
500 TABLET ORAL 2 TIMES DAILY WITH MEALS
Qty: 60 TABLET | Refills: 0 | OUTPATIENT
Start: 2024-09-13

## 2024-11-20 ENCOUNTER — OFFICE VISIT (OUTPATIENT)
Age: 59
End: 2024-11-20

## 2024-11-20 VITALS
WEIGHT: 217.7 LBS | OXYGEN SATURATION: 95 % | BODY MASS INDEX: 45.7 KG/M2 | DIASTOLIC BLOOD PRESSURE: 80 MMHG | HEART RATE: 100 BPM | SYSTOLIC BLOOD PRESSURE: 170 MMHG | HEIGHT: 58 IN

## 2024-11-20 DIAGNOSIS — E66.01 CLASS 3 SEVERE OBESITY DUE TO EXCESS CALORIES WITH BODY MASS INDEX (BMI) OF 45.0 TO 49.9 IN ADULT, UNSPECIFIED WHETHER SERIOUS COMORBIDITY PRESENT: ICD-10-CM

## 2024-11-20 DIAGNOSIS — Z12.11 SCREENING FOR COLORECTAL CANCER: ICD-10-CM

## 2024-11-20 DIAGNOSIS — L98.9 LESION OF FACE: ICD-10-CM

## 2024-11-20 DIAGNOSIS — Z12.12 SCREENING FOR COLORECTAL CANCER: ICD-10-CM

## 2024-11-20 DIAGNOSIS — E66.813 CLASS 3 SEVERE OBESITY DUE TO EXCESS CALORIES WITH BODY MASS INDEX (BMI) OF 45.0 TO 49.9 IN ADULT, UNSPECIFIED WHETHER SERIOUS COMORBIDITY PRESENT: ICD-10-CM

## 2024-11-20 DIAGNOSIS — Z59.9 FINANCIAL DIFFICULTY: ICD-10-CM

## 2024-11-20 DIAGNOSIS — I10 PRIMARY HYPERTENSION: Primary | ICD-10-CM

## 2024-11-20 DIAGNOSIS — R73.03 PREDIABETES: ICD-10-CM

## 2024-11-20 PROBLEM — Z12.4 CERVICAL CANCER SCREENING: Status: ACTIVE | Noted: 2024-11-20

## 2024-11-20 PROBLEM — M25.571 CHRONIC PAIN OF BOTH ANKLES: Chronic | Status: RESOLVED | Noted: 2022-12-08 | Resolved: 2024-11-20

## 2024-11-20 PROBLEM — Z12.4 CERVICAL CANCER SCREENING: Status: RESOLVED | Noted: 2024-11-20 | Resolved: 2024-11-20

## 2024-11-20 PROBLEM — G89.29 CHRONIC PAIN OF BOTH ANKLES: Chronic | Status: RESOLVED | Noted: 2022-12-08 | Resolved: 2024-11-20

## 2024-11-20 PROBLEM — M25.572 CHRONIC PAIN OF BOTH ANKLES: Chronic | Status: RESOLVED | Noted: 2022-12-08 | Resolved: 2024-11-20

## 2024-11-20 PROCEDURE — 3077F SYST BP >= 140 MM HG: CPT | Performed by: INTERNAL MEDICINE

## 2024-11-20 PROCEDURE — 3078F DIAST BP <80 MM HG: CPT | Performed by: INTERNAL MEDICINE

## 2024-11-20 RX ORDER — VALSARTAN AND HYDROCHLOROTHIAZIDE 160; 25 MG/1; MG/1
1 TABLET ORAL DAILY
Qty: 30 TABLET | Refills: 5 | Status: SHIPPED | OUTPATIENT
Start: 2024-11-20

## 2024-11-20 SDOH — ECONOMIC STABILITY: FOOD INSECURITY: WITHIN THE PAST 12 MONTHS, THE FOOD YOU BOUGHT JUST DIDN'T LAST AND YOU DIDN'T HAVE MONEY TO GET MORE.: OFTEN TRUE

## 2024-11-20 SDOH — ECONOMIC STABILITY: FOOD INSECURITY: WITHIN THE PAST 12 MONTHS, YOU WORRIED THAT YOUR FOOD WOULD RUN OUT BEFORE YOU GOT MONEY TO BUY MORE.: OFTEN TRUE

## 2024-11-20 SDOH — HEALTH STABILITY: PHYSICAL HEALTH: ON AVERAGE, HOW MANY DAYS PER WEEK DO YOU ENGAGE IN MODERATE TO STRENUOUS EXERCISE (LIKE A BRISK WALK)?: 0 DAYS

## 2024-11-20 SDOH — ECONOMIC STABILITY: INCOME INSECURITY: HOW HARD IS IT FOR YOU TO PAY FOR THE VERY BASICS LIKE FOOD, HOUSING, MEDICAL CARE, AND HEATING?: VERY HARD

## 2024-11-20 SDOH — ECONOMIC STABILITY - INCOME SECURITY: PROBLEM RELATED TO HOUSING AND ECONOMIC CIRCUMSTANCES, UNSPECIFIED: Z59.9

## 2024-11-20 ASSESSMENT — ANXIETY QUESTIONNAIRES
GAD7 TOTAL SCORE: 9
1. FEELING NERVOUS, ANXIOUS, OR ON EDGE: NOT AT ALL
6. BECOMING EASILY ANNOYED OR IRRITABLE: SEVERAL DAYS
7. FEELING AFRAID AS IF SOMETHING AWFUL MIGHT HAPPEN: MORE THAN HALF THE DAYS
2. NOT BEING ABLE TO STOP OR CONTROL WORRYING: NEARLY EVERY DAY
IF YOU CHECKED OFF ANY PROBLEMS ON THIS QUESTIONNAIRE, HOW DIFFICULT HAVE THESE PROBLEMS MADE IT FOR YOU TO DO YOUR WORK, TAKE CARE OF THINGS AT HOME, OR GET ALONG WITH OTHER PEOPLE: SOMEWHAT DIFFICULT
4. TROUBLE RELAXING: NOT AT ALL
3. WORRYING TOO MUCH ABOUT DIFFERENT THINGS: NEARLY EVERY DAY
5. BEING SO RESTLESS THAT IT IS HARD TO SIT STILL: NOT AT ALL

## 2024-11-20 ASSESSMENT — PATIENT HEALTH QUESTIONNAIRE - PHQ9
7. TROUBLE CONCENTRATING ON THINGS, SUCH AS READING THE NEWSPAPER OR WATCHING TELEVISION: NOT AT ALL
SUM OF ALL RESPONSES TO PHQ QUESTIONS 1-9: 0
2. FEELING DOWN, DEPRESSED OR HOPELESS: NOT AT ALL
8. MOVING OR SPEAKING SO SLOWLY THAT OTHER PEOPLE COULD HAVE NOTICED. OR THE OPPOSITE, BEING SO FIGETY OR RESTLESS THAT YOU HAVE BEEN MOVING AROUND A LOT MORE THAN USUAL: NOT AT ALL
10. IF YOU CHECKED OFF ANY PROBLEMS, HOW DIFFICULT HAVE THESE PROBLEMS MADE IT FOR YOU TO DO YOUR WORK, TAKE CARE OF THINGS AT HOME, OR GET ALONG WITH OTHER PEOPLE: NOT DIFFICULT AT ALL
6. FEELING BAD ABOUT YOURSELF - OR THAT YOU ARE A FAILURE OR HAVE LET YOURSELF OR YOUR FAMILY DOWN: NOT AT ALL
SUM OF ALL RESPONSES TO PHQ9 QUESTIONS 1 & 2: 0
SUM OF ALL RESPONSES TO PHQ QUESTIONS 1-9: 0
5. POOR APPETITE OR OVEREATING: NOT AT ALL
3. TROUBLE FALLING OR STAYING ASLEEP: NOT AT ALL
4. FEELING TIRED OR HAVING LITTLE ENERGY: NOT AT ALL
SUM OF ALL RESPONSES TO PHQ QUESTIONS 1-9: 0
1. LITTLE INTEREST OR PLEASURE IN DOING THINGS: NOT AT ALL
9. THOUGHTS THAT YOU WOULD BE BETTER OFF DEAD, OR OF HURTING YOURSELF: NOT AT ALL
SUM OF ALL RESPONSES TO PHQ QUESTIONS 1-9: 0

## 2024-11-20 ASSESSMENT — ENCOUNTER SYMPTOMS
EYE PAIN: 0
WHEEZING: 0
BLOOD IN STOOL: 0
SHORTNESS OF BREATH: 0
ABDOMINAL PAIN: 0
EYE REDNESS: 0
SINUS PRESSURE: 0

## 2024-11-20 NOTE — ASSESSMENT & PLAN NOTE
I am concerned that this may be a precancerous lesion or perhaps squamous cell cancer. I am referring her to a dermatologist.

## 2024-11-20 NOTE — PROGRESS NOTES
Marina Love (:  1965) is a 59 y.o. female,New patient, here for evaluation of the following chief complaint(s):  New Patient (Session: 38668/: [id number]), Hypertension, and Skin Tag (Back of neck/left side)      ASSESSMENT/PLAN:  1. Primary hypertension  Assessment & Plan:     Blood pressure is out of goal today, she has multiple prior visits with out of goal readings and her home log has about 50% of readings within goal, 50% above goal. We will increase diovan/HCTZ to 160/25  2. Prediabetes  Assessment & Plan:     Start metformin 500mg once daily to delay diabetes progression.  3. Class 3 severe obesity due to excess calories with body mass index (BMI) of 45.0 to 49.9 in adult, unspecified whether serious comorbidity present  Assessment & Plan:     Healthy lifestyle dicussed with emphasis on calorie tracking and restricting to 1800 calories per day as well as increasing aerobic exercise.  4. Screening for colorectal cancer  Assessment & Plan:     She has previously been referred for colonoscopy, could not afford this. We will see if financial assistance can be applied in the future.  5. Financial difficulty  Assessment & Plan:     She is being referred to [facility] for medications as well as our outreach team and is being given forms to apply for additional financial assistance.  Orders:  -     [facility] Out[facility]  6. Lesion of face  Assessment & Plan:     I am concerned that this may be a precancerous lesion or perhaps squamous cell cancer. I am referring her to a dermatologist.  Orders:  -     [facility] - Migel Redd MD, Dermatology, [place]      Return in about 4 weeks (around 2024) for HTN and pre-diabetes (needs A1c).    SUBJECTIVE/OBJECTIVE:  This is a 59 year old woman - Egyptian speaking and interviewed with an , with history of septic arthritis of left ankle (2019) prediabetes, HTN. She presents to establish

## 2024-11-20 NOTE — ASSESSMENT & PLAN NOTE
Healthy lifestyle dicussed with emphasis on calorie tracking and restricting to 1800 calories per day as well as increasing aerobic exercise.

## 2024-11-20 NOTE — ASSESSMENT & PLAN NOTE
Blood pressure is out of goal today, she has multiple prior visits with out of goal readings and her home log has about 50% of readings within goal, 50% above goal. We will increase diovan/HCTZ to 160/25

## 2024-11-20 NOTE — ASSESSMENT & PLAN NOTE
She is being referred to [facility] for medications as well as our outreach team and is being given forms to apply for additional financial assistance.

## 2024-11-20 NOTE — PATIENT INSTRUCTIONS
correspondiente se                  puede utilizar para comprar frutas y verduras, por lo que añade más opciones de alimentos saludables para los beneficiarios de SNAP.  Contacto: https://ThreatStream.org/locations/      SNAP (anteriormente cupones para alimentos)  Lo que ofrecen: SNAP se utiliza sunil dinero en efectivo para comprar productos alimenticios elegibles de minoristas autorizados.  Solicitar beneficios en línea: https://s.[place].gov/ofam/foodstamps.stm .  Solicite los beneficios por teléfono o en persona visitando los Servicios de Empleo y para las Familias locales. Localice los Servicios de Empleo y para las Familias de cohn condado buscando en el directorio en [email or web address].      Meals on Wheels  Lo que ofrecen: Meals on Wheels es un programa que entrega comidas a personas que no cuentan con un medio confiable para mantener zack dieta saludable.  Para solicitar el programa:  De 18 a 59 años de edad:  Solicítelo en línea: https://www.Podaddiesmeal.org/.  Solicítelo por teléfono: [phone].  60 años de edad o más:  Consejo de edad avanzada: [phone].      [facility] [place]  Lo que ofrecen: presta servicios a vecinos del área [facility] a través de cohn red de conferencias (grupos de voluntarios con sede en zack naomi) para recibir ayuda con necesidades sunil alimentos, ropa, muebles, alquiler, servicios públicos o eleanor.  Sitio web: https://www.Sqootnati.org/get-help/  Teléfono: [phone]  Vaccine Information Sheet: Influenza  given to Marina Love for review and verbalized understanding of material. Marina Maldonadoada was given the opportunity to ask questions.   Documented vaccine given per order.     VIS given and made available within patient's chart for future reference.

## 2024-11-20 NOTE — ASSESSMENT & PLAN NOTE
She has previously been referred for colonoscopy, could not afford this. We will see if financial assistance can be applied in the future.

## 2024-11-22 ENCOUNTER — COMMUNITY OUTREACH (OUTPATIENT)
Dept: OTHER | Age: 59
End: 2024-11-22

## 2024-11-22 NOTE — PROGRESS NOTES
[facility]        CHW Zeus Maki contacted patient to assess for possible needs and also discussing home visits. She asked for food and utility resources. She stated that her english is very limited and would prefer places that speak [place]i.    CHW explained to her what is a food pantry and how it can benefit her family.       CHW sent an email to Mrs Love with resources such as: [facility], [facility] and [employer].

## 2024-12-18 ENCOUNTER — OFFICE VISIT (OUTPATIENT)
Age: 59
End: 2024-12-18

## 2024-12-18 VITALS
WEIGHT: 219.5 LBS | BODY MASS INDEX: 46.39 KG/M2 | DIASTOLIC BLOOD PRESSURE: 84 MMHG | HEART RATE: 90 BPM | SYSTOLIC BLOOD PRESSURE: 152 MMHG | OXYGEN SATURATION: 95 %

## 2024-12-18 DIAGNOSIS — E66.813 CLASS 3 SEVERE OBESITY DUE TO EXCESS CALORIES WITH BODY MASS INDEX (BMI) OF 45.0 TO 49.9 IN ADULT, UNSPECIFIED WHETHER SERIOUS COMORBIDITY PRESENT: ICD-10-CM

## 2024-12-18 DIAGNOSIS — R73.03 PREDIABETES: ICD-10-CM

## 2024-12-18 DIAGNOSIS — I10 PRIMARY HYPERTENSION: Primary | ICD-10-CM

## 2024-12-18 DIAGNOSIS — E66.01 CLASS 3 SEVERE OBESITY DUE TO EXCESS CALORIES WITH BODY MASS INDEX (BMI) OF 45.0 TO 49.9 IN ADULT, UNSPECIFIED WHETHER SERIOUS COMORBIDITY PRESENT: ICD-10-CM

## 2024-12-18 LAB — HBA1C MFR BLD: 6.3 %

## 2024-12-18 PROCEDURE — 3079F DIAST BP 80-89 MM HG: CPT | Performed by: INTERNAL MEDICINE

## 2024-12-18 PROCEDURE — 83036 HEMOGLOBIN GLYCOSYLATED A1C: CPT | Performed by: INTERNAL MEDICINE

## 2024-12-18 PROCEDURE — 3077F SYST BP >= 140 MM HG: CPT | Performed by: INTERNAL MEDICINE

## 2024-12-18 PROCEDURE — 99214 OFFICE O/P EST MOD 30 MIN: CPT | Performed by: INTERNAL MEDICINE

## 2024-12-18 RX ORDER — NIFEDIPINE 60 MG/1
60 TABLET, EXTENDED RELEASE ORAL DAILY
Qty: 90 TABLET | Refills: 1 | Status: SHIPPED | OUTPATIENT
Start: 2024-12-18

## 2024-12-18 RX ORDER — NIFEDIPINE 90 MG/1
90 TABLET, EXTENDED RELEASE ORAL DAILY
Qty: 90 TABLET | Refills: 1 | Status: CANCELLED | OUTPATIENT
Start: 2024-12-18

## 2024-12-18 RX ORDER — NIFEDIPINE 30 MG/1
30 TABLET, EXTENDED RELEASE ORAL DAILY
Qty: 90 TABLET | Refills: 1 | Status: SHIPPED | OUTPATIENT
Start: 2024-12-18

## 2024-12-18 ASSESSMENT — ENCOUNTER SYMPTOMS
SINUS PRESSURE: 0
BLOOD IN STOOL: 0
ABDOMINAL PAIN: 0
EYE REDNESS: 0
SHORTNESS OF BREATH: 0
WHEEZING: 0
EYE PAIN: 0

## 2024-12-18 NOTE — ASSESSMENT & PLAN NOTE
Patient has 6.3% HbA1c in office today. I again recommended weight management and healthy lifestyle. She will continue metformin.

## 2024-12-18 NOTE — ASSESSMENT & PLAN NOTE
Healthy lifestyle counseling again provided with emphasis on calorie tracking and restricting to 1800 calories per day as well as gradually increasing aerobic exercise.

## 2024-12-18 NOTE — PROGRESS NOTES
Marina Love (:  1965) is a 59 y.o. female,Established patient, here for evaluation of the following chief complaint(s):  Diabetes (Session: 05912/: [id number]), Hypertension, and Neck Pain (1 wk)      ASSESSMENT/PLAN:  1. Primary hypertension  Assessment & Plan:     Blood pressure today remains uncontrolled. Start nifedipine XL 90mg once daily. Patient to return in 4 weeks.    2. Prediabetes  Assessment & Plan:     Patient has 6.3% HbA1c in office today. I again recommended weight management and healthy lifestyle. She will continue metformin.  3. Class 3 severe obesity due to excess calories with body mass index (BMI) of 45.0 to 49.9 in adult, unspecified whether serious comorbidity present  Assessment & Plan:     Healthy lifestyle counseling again provided with emphasis on calorie tracking and restricting to 1800 calories per day as well as gradually increasing aerobic exercise.       Return in about 4 weeks (around 1/15/2025) for HTN.    SUBJECTIVE/OBJECTIVE:  This is a 59 year old woman - Croatian speaking and interviewed with an , with history of septic arthritis of left ankle () prediabetes, HTN. She presents in follow up.  Her home BP log shows about 50% controlled, 50% uncontrolled Bps. She denies chest pain, shortness of breath, blurred vision or headaches. She has been on Diovan and states she has not missed medications.    She has been taking metformin, which was prescribed for her prediabetes. She denies any issues taking this.     She has gained about 2 pounds since last OV. She has been starting to track calories at times, has not been consistent with counting or restricting.             Past Medical History:   Diagnosis Date    Encounter to establish care 2022    MSSA (methicillin susceptible Staphylococcus aureus) infection     Vaginal itching 2017    Formatting of this note might be different from the original.   Affirm sent 17      Past Surgical

## 2024-12-18 NOTE — ASSESSMENT & PLAN NOTE
Blood pressure today remains uncontrolled. Start nifedipine XL 90mg once daily. Patient to return in 4 weeks.

## 2024-12-20 PROBLEM — Z12.12 SCREENING FOR COLORECTAL CANCER: Status: RESOLVED | Noted: 2024-11-20 | Resolved: 2024-12-20

## 2024-12-20 PROBLEM — Z12.11 SCREENING FOR COLORECTAL CANCER: Status: RESOLVED | Noted: 2024-11-20 | Resolved: 2024-12-20

## (undated) DEVICE — GAUZE,SPONGE,4"X4",8PLY,STRL,LF,10/TRAY: Brand: MEDLINE

## (undated) DEVICE — GLOVE SURG SZ 65 L12IN FNGR THK79MIL GRN LTX FREE

## (undated) DEVICE — BANDAGE,GAUZE,BULKEE II,4.5"X4.1YD,STRL: Brand: MEDLINE

## (undated) DEVICE — GLOVE ORANGE PI 8   MSG9080

## (undated) DEVICE — SUTURE ETHLN SZ 3-0 L18IN NONABSORBABLE BLK PS-2 L19MM 3/8 1669H

## (undated) DEVICE — SPONGE LAP W18XL18IN WHT COT 4 PLY FLD STRUNG RADPQ DISP ST

## (undated) DEVICE — BANDAGE,ELASTIC,ESMARK,STERILE,6"X9',LF: Brand: MEDLINE

## (undated) DEVICE — KIT EVAC 400CC DIA3/16IN H PAT 12.5IN 3 SPR RND SHP PVC DRN

## (undated) DEVICE — COLLECTOR SPEC RAYON LIQ STUART DBL FOR THRT VAG SKIN WND

## (undated) DEVICE — INTENDED FOR TISSUE SEPARATION, AND OTHER PROCEDURES THAT REQUIRE A SHARP SURGICAL BLADE TO PUNCTURE OR CUT.: Brand: BARD-PARKER ® STAINLESS STEEL BLADES

## (undated) DEVICE — GLOVE SURG SZ 6 THK91MIL LTX FREE SYN POLYISOPRENE ANTI

## (undated) DEVICE — TRAY PREP DRY W/ PREM GLV 2 APPL 6 SPNG 2 UNDPD 1 OVERWRAP

## (undated) DEVICE — SOLUTION IV IRRIG 500ML 0.9% SODIUM CHL 2F7123

## (undated) DEVICE — DRESSING,GAUZE,XEROFORM,CURAD,1"X8",ST: Brand: CURAD

## (undated) DEVICE — SYRINGE IRRIG 60ML SFT PLIABLE BLB EZ TO GRP 1 HND USE W/

## (undated) DEVICE — SWAB CULT SGL AMIES W/O CHAR FOR THRT VAG SKIN HRT CULTSWAB

## (undated) DEVICE — PACK PROCEDURE SURG EXTREMITY MFFOP CUST

## (undated) DEVICE — DRAPE,U/ SHT,SPLIT,PLAS,STERIL: Brand: MEDLINE

## (undated) DEVICE — Z CONVERTED USE 2273164 BANDAGE COMPR W4INXL4 1/2YD E EC SGL LAYERED CLP CLSR ECONO

## (undated) DEVICE — Z DISCONTINUED USE 2275686 GLOVE SURG SZ 8 L12IN FNGR THK13MIL WHT ISOLEX POLYISOPRENE

## (undated) DEVICE — T-DRAPE,EXTREMITY,STERILE: Brand: MEDLINE

## (undated) DEVICE — BASIC SINGLE BASIN 1-LF: Brand: MEDLINE INDUSTRIES, INC.

## (undated) DEVICE — PAD,ABDOMINAL,8"X10",ST,LF: Brand: MEDLINE

## (undated) DEVICE — SUTURE PDS II SZ 2-0 L27IN ABSRB VLT L36MM CT-1 1/2 CIR Z339H

## (undated) DEVICE — TOWEL,OR,DSP,ST,BLUE,STD,4/PK,20PK/CS: Brand: MEDLINE